# Patient Record
Sex: MALE | Employment: UNEMPLOYED | URBAN - METROPOLITAN AREA
[De-identification: names, ages, dates, MRNs, and addresses within clinical notes are randomized per-mention and may not be internally consistent; named-entity substitution may affect disease eponyms.]

---

## 2020-04-01 ENCOUNTER — HOSPITAL ENCOUNTER (INPATIENT)
Facility: HOSPITAL | Age: 70
LOS: 6 days | Discharge: HOME | DRG: 177 | End: 2020-04-07
Attending: EMERGENCY MEDICINE | Admitting: FAMILY MEDICINE
Payer: COMMERCIAL

## 2020-04-01 ENCOUNTER — APPOINTMENT (EMERGENCY)
Dept: RADIOLOGY | Facility: HOSPITAL | Age: 70
DRG: 177 | End: 2020-04-01
Payer: COMMERCIAL

## 2020-04-01 DIAGNOSIS — R06.02 SHORTNESS OF BREATH: Primary | ICD-10-CM

## 2020-04-01 DIAGNOSIS — U07.1 PNEUMONIA DUE TO COVID-19 VIRUS: ICD-10-CM

## 2020-04-01 DIAGNOSIS — R09.02 HYPOXIA: ICD-10-CM

## 2020-04-01 DIAGNOSIS — J12.82 PNEUMONIA DUE TO COVID-19 VIRUS: ICD-10-CM

## 2020-04-01 PROBLEM — J12.9 VIRAL PNEUMONIA: Status: ACTIVE | Noted: 2020-04-01

## 2020-04-01 PROBLEM — J96.01 ACUTE RESPIRATORY FAILURE WITH HYPOXIA (HCC): Status: ACTIVE | Noted: 2020-04-01

## 2020-04-01 LAB
ALBUMIN SERPL BCP-MCNC: 3.7 G/DL (ref 3–5.2)
ALP SERPL-CCNC: 40 U/L (ref 43–122)
ALT SERPL W P-5'-P-CCNC: 31 U/L (ref 9–52)
ANION GAP SERPL CALCULATED.3IONS-SCNC: 7 MMOL/L (ref 5–14)
AST SERPL W P-5'-P-CCNC: 40 U/L (ref 17–59)
ATRIAL RATE: 87 BPM
BASOPHILS # BLD AUTO: 0.1 THOUSANDS/ΜL (ref 0–0.1)
BASOPHILS NFR BLD AUTO: 1 % (ref 0–1)
BILIRUB SERPL-MCNC: 0.6 MG/DL
BUN SERPL-MCNC: 19 MG/DL (ref 5–25)
CALCIUM SERPL-MCNC: 8.3 MG/DL (ref 8.4–10.2)
CHLORIDE SERPL-SCNC: 104 MMOL/L (ref 97–108)
CO2 SERPL-SCNC: 25 MMOL/L (ref 22–30)
CREAT SERPL-MCNC: 1.21 MG/DL (ref 0.7–1.5)
EOSINOPHIL # BLD AUTO: 0.1 THOUSAND/ΜL (ref 0–0.4)
EOSINOPHIL NFR BLD AUTO: 2 % (ref 0–6)
ERYTHROCYTE [DISTWIDTH] IN BLOOD BY AUTOMATED COUNT: 13.6 %
FLUAV RNA NPH QL NAA+PROBE: NORMAL
FLUBV RNA NPH QL NAA+PROBE: NORMAL
GFR SERPL CREATININE-BSD FRML MDRD: 61 ML/MIN/1.73SQ M
GLUCOSE SERPL-MCNC: 106 MG/DL (ref 70–99)
HCT VFR BLD AUTO: 40.1 % (ref 41–53)
HGB BLD-MCNC: 13.5 G/DL (ref 13.5–17.5)
LACTATE SERPL-SCNC: 1.3 MMOL/L (ref 0.7–2)
LYMPHOCYTES # BLD AUTO: 0.7 THOUSANDS/ΜL (ref 0.5–4)
LYMPHOCYTES NFR BLD AUTO: 12 % (ref 25–45)
MCH RBC QN AUTO: 29.6 PG (ref 26–34)
MCHC RBC AUTO-ENTMCNC: 33.8 G/DL (ref 31–36)
MCV RBC AUTO: 88 FL (ref 80–100)
MONOCYTES # BLD AUTO: 0.2 THOUSAND/ΜL (ref 0.2–0.9)
MONOCYTES NFR BLD AUTO: 4 % (ref 1–10)
NEUTROPHILS # BLD AUTO: 4.6 THOUSANDS/ΜL (ref 1.8–7.8)
NEUTS SEG NFR BLD AUTO: 80 % (ref 45–65)
NT-PROBNP SERPL-MCNC: 68.2 PG/ML (ref 0–299)
P AXIS: 49 DEGREES
PLATELET # BLD AUTO: 201 THOUSANDS/UL (ref 150–450)
PMV BLD AUTO: 9 FL (ref 8.9–12.7)
POTASSIUM SERPL-SCNC: 4 MMOL/L (ref 3.6–5)
PR INTERVAL: 172 MS
PROCALCITONIN SERPL-MCNC: <0.05 NG/ML
PROT SERPL-MCNC: 7.1 G/DL (ref 5.9–8.4)
QRS AXIS: 12 DEGREES
QRSD INTERVAL: 90 MS
QT INTERVAL: 388 MS
QTC INTERVAL: 466 MS
RBC # BLD AUTO: 4.58 MILLION/UL (ref 4.5–5.9)
RSV RNA NPH QL NAA+PROBE: NORMAL
SODIUM SERPL-SCNC: 136 MMOL/L (ref 137–147)
T WAVE AXIS: 26 DEGREES
TROPONIN I SERPL-MCNC: <0.01 NG/ML (ref 0–0.03)
VENTRICULAR RATE: 87 BPM
WBC # BLD AUTO: 5.7 THOUSAND/UL (ref 4.5–11)

## 2020-04-01 PROCEDURE — 87040 BLOOD CULTURE FOR BACTERIA: CPT | Performed by: PHYSICIAN ASSISTANT

## 2020-04-01 PROCEDURE — 83880 ASSAY OF NATRIURETIC PEPTIDE: CPT | Performed by: PHYSICIAN ASSISTANT

## 2020-04-01 PROCEDURE — 82728 ASSAY OF FERRITIN: CPT | Performed by: PHYSICIAN ASSISTANT

## 2020-04-01 PROCEDURE — 71045 X-RAY EXAM CHEST 1 VIEW: CPT

## 2020-04-01 PROCEDURE — 93010 ELECTROCARDIOGRAM REPORT: CPT | Performed by: INTERNAL MEDICINE

## 2020-04-01 PROCEDURE — 83615 LACTATE (LD) (LDH) ENZYME: CPT | Performed by: PHYSICIAN ASSISTANT

## 2020-04-01 PROCEDURE — 99223 1ST HOSP IP/OBS HIGH 75: CPT | Performed by: FAMILY MEDICINE

## 2020-04-01 PROCEDURE — 84484 ASSAY OF TROPONIN QUANT: CPT | Performed by: PHYSICIAN ASSISTANT

## 2020-04-01 PROCEDURE — 83605 ASSAY OF LACTIC ACID: CPT | Performed by: PHYSICIAN ASSISTANT

## 2020-04-01 PROCEDURE — 84145 PROCALCITONIN (PCT): CPT | Performed by: PHYSICIAN ASSISTANT

## 2020-04-01 PROCEDURE — 87635 SARS-COV-2 COVID-19 AMP PRB: CPT | Performed by: PHYSICIAN ASSISTANT

## 2020-04-01 PROCEDURE — 99285 EMERGENCY DEPT VISIT HI MDM: CPT | Performed by: PHYSICIAN ASSISTANT

## 2020-04-01 PROCEDURE — 87631 RESP VIRUS 3-5 TARGETS: CPT | Performed by: PHYSICIAN ASSISTANT

## 2020-04-01 PROCEDURE — 86140 C-REACTIVE PROTEIN: CPT | Performed by: PHYSICIAN ASSISTANT

## 2020-04-01 PROCEDURE — 87449 NOS EACH ORGANISM AG IA: CPT | Performed by: PHYSICIAN ASSISTANT

## 2020-04-01 PROCEDURE — 36415 COLL VENOUS BLD VENIPUNCTURE: CPT | Performed by: PHYSICIAN ASSISTANT

## 2020-04-01 PROCEDURE — 82553 CREATINE MB FRACTION: CPT | Performed by: PHYSICIAN ASSISTANT

## 2020-04-01 PROCEDURE — 80053 COMPREHEN METABOLIC PANEL: CPT | Performed by: PHYSICIAN ASSISTANT

## 2020-04-01 PROCEDURE — 99285 EMERGENCY DEPT VISIT HI MDM: CPT

## 2020-04-01 PROCEDURE — 85025 COMPLETE CBC W/AUTO DIFF WBC: CPT | Performed by: PHYSICIAN ASSISTANT

## 2020-04-01 PROCEDURE — 93005 ELECTROCARDIOGRAM TRACING: CPT

## 2020-04-01 PROCEDURE — 82550 ASSAY OF CK (CPK): CPT | Performed by: PHYSICIAN ASSISTANT

## 2020-04-01 RX ORDER — HYDROXYCHLOROQUINE SULFATE 200 MG/1
200 TABLET, FILM COATED ORAL 2 TIMES DAILY
Status: COMPLETED | OUTPATIENT
Start: 2020-04-02 | End: 2020-04-06

## 2020-04-01 RX ORDER — HYDROXYCHLOROQUINE SULFATE 200 MG/1
400 TABLET, FILM COATED ORAL 2 TIMES DAILY
Status: COMPLETED | OUTPATIENT
Start: 2020-04-01 | End: 2020-04-02

## 2020-04-01 RX ORDER — ACETAMINOPHEN 325 MG/1
650 TABLET ORAL EVERY 6 HOURS PRN
Status: DISCONTINUED | OUTPATIENT
Start: 2020-04-01 | End: 2020-04-07 | Stop reason: HOSPADM

## 2020-04-01 RX ORDER — ONDANSETRON 2 MG/ML
4 INJECTION INTRAMUSCULAR; INTRAVENOUS EVERY 6 HOURS PRN
Status: DISCONTINUED | OUTPATIENT
Start: 2020-04-01 | End: 2020-04-02

## 2020-04-01 RX ADMIN — HYDROXYCHLOROQUINE SULFATE 400 MG: 200 TABLET, FILM COATED ORAL at 18:00

## 2020-04-01 RX ADMIN — ACETAMINOPHEN 650 MG: 325 TABLET ORAL at 21:17

## 2020-04-01 NOTE — ED NOTES
This RN attempted to call back Kathia Nolasco (Nurse from facility where patient resides) [phone] , no response and no voicemail        Jayy Adam RN  04/01/20 7371

## 2020-04-01 NOTE — ASSESSMENT & PLAN NOTE
Patient presented from [facility] with complaint of increasing short of breath for last 2 days, was found to have fever of 101 by the EMS, fever of 99 8 in the ER  Patient was also found to be hypoxic in ER, currently at nasal cannula 4 L, breathing comfortably at 94%  In the ER, lymphocytopenia noted, normal white blood cell count, normal platelets, normal lactic acid  Pending flu RSV urinary Legionella and strep, blood culture  Chest x-ray is consistent with viral pneumonia : Somewhat technically limited examination demonstrating parenchymal pattern in the well-visualized mid and lower lung zones that is nonspecific but can be seen in the setting of viral pneumonia    Pending COVID 19, place in droplet, contact and airborne precaution  Continue oxygen  Start Plaquenil 400 mg twice a day for 1 day, then start 200 mg twice a day for 4 days  EKG reviewed, normal sinus rhythm, normal QT

## 2020-04-01 NOTE — ED NOTES
Patient provided with a lunch tray, reminded patient that he must put his surgical mask back on as soon as he is done eating       Mercedez Pleitez RN  04/01/20 3538

## 2020-04-01 NOTE — ED NOTES
Patient weaned down to 3 liters of oxygen via nasal cannula, per admitting provider  Oxygen saturation remained at 95%        Art Lucas RN  04/01/20 4189

## 2020-04-01 NOTE — ASSESSMENT & PLAN NOTE
Secondary to viral pneumonia, presumptive COVID 19 positive, pending, flu/rsv, urinary legionella/strep  Patient presented with worsening short of breath for past 2 days with fever  A known sick contact however he lives in [address] [place] [address] which is a group home, recently was discharged from [address]  On presentation in the ER, was found to have oxygen saturation 83% in room air, immediately improved to 94% with oxygen via nasal cannula, he was on 4 L oxygen, now weaning off, to 3L, oxygenating >95%    Will place patient on respiratory protocol, continuous pulse ox, telemetry

## 2020-04-01 NOTE — ED NOTES
Patient asked this RN to contact his wife Urmila Wiggins  This RN contacted patients spouse and gave her and update  Spouse's information placed in demographics in chart        Thaddeus George RN  04/01/20 8372

## 2020-04-01 NOTE — H&P
H&P- Yanick Verma 1950, 77 y o  male MRN: [id number]    Unit/Bed#: ED 10 Encounter: [id number]    Primary Care Provider: No primary care provider on file  Date and time admitted to hospital: 4/1/2020 11:02 AM        Viral pneumonia  Assessment & Plan  Patient presented from [facility] with complaint of increasing short of breath for last 2 days, was found to have fever of 101 by the EMS, fever of 99 8 in the ER  Patient was also found to be hypoxic in ER, currently at nasal cannula 4 L, breathing comfortably at 94%  In the ER, lymphocytopenia noted, normal white blood cell count, normal platelets, normal lactic acid  Pending flu RSV urinary Legionella and strep, blood culture  Chest x-ray is consistent with viral pneumonia : Somewhat technically limited examination demonstrating parenchymal pattern in the well-visualized mid and lower lung zones that is nonspecific but can be seen in the setting of viral pneumonia  Pending COVID 19, place in droplet, contact and airborne precaution  Continue oxygen  Start Plaquenil 400 mg twice a day for 1 day, then start 200 mg twice a day for 4 days  EKG reviewed, normal sinus rhythm, normal QT    * Acute respiratory failure with hypoxia ([address] )  Assessment & Plan  Secondary to viral pneumonia, presumptive COVID 19 positive, pending, flu/rsv, urinary legionella/strep  Patient presented with worsening short of breath for past 2 days with fever  A known sick contact however he lives in [facility] which is a group home, recently was discharged from [address]  On presentation in the ER, was found to have oxygen saturation 83% in room air, immediately improved to 94% with oxygen via nasal cannula, he was on 4 L oxygen, now weaning off, to 3L, oxygenating >95%    Will place patient on respiratory protocol, continuous pulse ox, telemetry      VTE Prophylaxis: Enoxaparin (Lovenox)  / sequential compression device   Code Status: full     Anticipated Length of Stay: Patient will be admitted on an Inpatient basis with an anticipated length of stay of  > 2 midnights  Justification for Hospital Stay: acute resp failure, viral pneumonia    Total Time for Visit, including Counseling / Coordination of Care: 1 hour  Greater than 50% of this total time spent on direct patient counseling and coordination of care  Chief Complaint:   Shortness of breath    History of Present Illness:    Brina Lopez is a 77 y o  male who presents with worsening short of breath from past 2 days  Patient states he does not have any past medical history, never been a smoker, currently does not smoke  Patient presents from [id number] [place] [address] which is a group home where he moved in 2 days ago  Prior to that he was incarcerated in the [facility] present  EMS found to have fever of 101, in ER fever was 99 8  In ER, normal white blood cell count with lymphocytopenia, normal lactic acid, chest x-ray is consistent with viral pneumonia bilaterally  Patient was found to have pulse ox of 83% on admission in room air, which improved to 94% with nasal cannula  On my exam, patient states shortness of breath is better with the oxygen  No significant cough  Review of Systems:    Review of Systems   Constitutional: Positive for fever  Negative for activity change and appetite change  HENT: Negative for congestion and sore throat  Eyes: Negative for pain and visual disturbance  Respiratory: Positive for shortness of breath  Negative for cough  Cardiovascular: Negative for chest pain and leg swelling  Gastrointestinal: Negative for abdominal distention and abdominal pain  Endocrine: Negative for polyuria  Genitourinary: Negative for difficulty urinating  Musculoskeletal: Negative for arthralgias and joint swelling  Skin: Negative for wound  Allergic/Immunologic: Negative for food allergies  Neurological: Negative for light-headedness and headaches  Hematological: Negative for adenopathy  Psychiatric/Behavioral: Negative for sleep disturbance  Past Medical and Surgical History:     History reviewed  No pertinent past medical history  Past Surgical History:   Procedure Laterality Date    BRAIN SURGERY         Meds/Allergies:    Prior to Admission medications    Not on File     I have reviewed home medications using allscripts  Allergies: No Known Allergies    Social History:     Marital Status:     Occupation: unemployhed  Patient Pre-hospital Living Situation: [facility]  Patient Pre-hospital Level of Mobility: active  Patient Pre-hospital Diet Restrictions: none  Substance Use History:   Social History     Substance and Sexual Activity   Alcohol Use Not Currently    Comment: "Not anymore"      Social History     Tobacco Use   Smoking Status Never Smoker   Smokeless Tobacco Never Used     Social History     Substance and Sexual Activity   Drug Use Not Currently    Types: Cocaine    Comment: Last used in December 2019       Family History:    History reviewed  No pertinent family history  Physical Exam:     Vitals:   Blood Pressure: 118/71 (04/01/20 1230)  Pulse: 89 (04/01/20 1230)  Temperature: 99 8 °F (37 7 °C) (04/01/20 1110)  Temp Source: Oral (04/01/20 1110)  Respirations: 22 (04/01/20 1230)  Weight - Scale: 87 8 kg (193 lb 9 6 oz) (04/01/20 1110)  SpO2: 92 % (04/01/20 1230)    Physical Exam   Constitutional: He is oriented to person, place, and time  He appears well-developed and well-nourished  HENT:   Head: Normocephalic and atraumatic  Right Ear: External ear normal    Left Ear: External ear normal    Nose: Nose normal    Pulmonary/Chest: Effort normal  No respiratory distress  No visible increased work of breathing on nasal canula   Neurological: He is alert and oriented to person, place, and time  Skin: Skin is dry  Additional Data:     Lab Results: I have personally reviewed pertinent reports        Results from last 7 days   Lab Units 04/01/20  1135   WBC Thousand/uL 5 70   HEMOGLOBIN g/dL 13 5   HEMATOCRIT % 40 1*   PLATELETS Thousands/uL 201   NEUTROS PCT % 80*   LYMPHS PCT % 12*   MONOS PCT % 4   EOS PCT % 2     Results from last 7 days   Lab Units 04/01/20  1135   SODIUM mmol/L 136*   POTASSIUM mmol/L 4 0   CHLORIDE mmol/L 104   CO2 mmol/L 25   BUN mg/dL 19   CREATININE mg/dL 1 21   ANION GAP mmol/L 7   CALCIUM mg/dL 8 3*   ALBUMIN g/dL 3 7   TOTAL BILIRUBIN mg/dL 0 60   ALK PHOS U/L 40*   ALT U/L 31   AST U/L 40   GLUCOSE RANDOM mg/dL 106*                 Results from last 7 days   Lab Units 04/01/20  1135   LACTIC ACID mmol/L 1 3       Imaging: I have personally reviewed pertinent reports  XR chest portable   Final Result by Holden De Paz MD (04/01 1243)      Somewhat technically limited examination demonstrating parenchymal pattern in the well-visualized mid and lower lung zones that is nonspecific but can be seen in the setting of viral pneumonia  This examination was marked "immediate notification" in Epic in order to begin the standard process by which the radiology reading room liaison alerts the referring practitioner  Workstation performed: [id number]             EKG, Pathology, and Other Studies Reviewed on Admission:   · EKG: normal sinus rhythm    Allscripts / Epic Records Reviewed: Yes     ** Please Note: This note has been constructed using a voice recognition system   **

## 2020-04-02 PROBLEM — U07.1 COVID-19 VIRUS INFECTION: Status: ACTIVE | Noted: 2020-04-02

## 2020-04-02 PROBLEM — J12.82 PNEUMONIA DUE TO COVID-19 VIRUS: Status: ACTIVE | Noted: 2020-04-01

## 2020-04-02 PROBLEM — U07.1 PNEUMONIA DUE TO COVID-19 VIRUS: Status: ACTIVE | Noted: 2020-04-01

## 2020-04-02 LAB
CK MB SERPL-MCNC: <1 % (ref 0–2.5)
CK MB SERPL-MCNC: <1 NG/ML (ref 0–5)
CK SERPL-CCNC: 193 U/L (ref 39–308)
D DIMER PPP FEU-MCNC: 7.74 UG/ML FEU
ERYTHROCYTE [SEDIMENTATION RATE] IN BLOOD: 80 MM/HOUR (ref 1–20)
FERRITIN SERPL-MCNC: 630 NG/ML (ref 8–388)
FIBRINOGEN PPP-MCNC: 677 MG/DL (ref 227–495)
L PNEUMO1 AG UR QL IA.RAPID: NEGATIVE
LDH SERPL-CCNC: 476 U/L (ref 81–234)
S PNEUM AG UR QL: NEGATIVE
SARS-COV-2 RNA SPEC QL NAA+PROBE: DETECTED

## 2020-04-02 PROCEDURE — 85384 FIBRINOGEN ACTIVITY: CPT | Performed by: PHYSICIAN ASSISTANT

## 2020-04-02 PROCEDURE — 85652 RBC SED RATE AUTOMATED: CPT | Performed by: PHYSICIAN ASSISTANT

## 2020-04-02 PROCEDURE — 82955 ASSAY OF G6PD ENZYME: CPT | Performed by: PHYSICIAN ASSISTANT

## 2020-04-02 PROCEDURE — 85379 FIBRIN DEGRADATION QUANT: CPT | Performed by: PHYSICIAN ASSISTANT

## 2020-04-02 PROCEDURE — 99232 SBSQ HOSP IP/OBS MODERATE 35: CPT | Performed by: PHYSICIAN ASSISTANT

## 2020-04-02 RX ORDER — CEFTRIAXONE 1 G/50ML
1000 INJECTION, SOLUTION INTRAVENOUS EVERY 24 HOURS
Status: DISCONTINUED | OUTPATIENT
Start: 2020-04-02 | End: 2020-04-07 | Stop reason: HOSPADM

## 2020-04-02 RX ORDER — ASCORBIC ACID 500 MG
1000 TABLET ORAL 2 TIMES DAILY
Status: DISCONTINUED | OUTPATIENT
Start: 2020-04-02 | End: 2020-04-07 | Stop reason: HOSPADM

## 2020-04-02 RX ORDER — ATORVASTATIN CALCIUM 40 MG/1
40 TABLET, FILM COATED ORAL
Status: DISCONTINUED | OUTPATIENT
Start: 2020-04-02 | End: 2020-04-07 | Stop reason: HOSPADM

## 2020-04-02 RX ORDER — METHYLPREDNISOLONE SODIUM SUCCINATE 125 MG/2ML
80 INJECTION, POWDER, LYOPHILIZED, FOR SOLUTION INTRAMUSCULAR; INTRAVENOUS DAILY
Status: DISCONTINUED | OUTPATIENT
Start: 2020-04-02 | End: 2020-04-04

## 2020-04-02 RX ORDER — ZINC SULFATE 50(220)MG
220 CAPSULE ORAL DAILY
Status: DISCONTINUED | OUTPATIENT
Start: 2020-04-02 | End: 2020-04-07 | Stop reason: HOSPADM

## 2020-04-02 RX ORDER — AZITHROMYCIN 250 MG/1
500 TABLET, FILM COATED ORAL EVERY 24 HOURS
Status: COMPLETED | OUTPATIENT
Start: 2020-04-02 | End: 2020-04-02

## 2020-04-02 RX ORDER — AZITHROMYCIN 250 MG/1
250 TABLET, FILM COATED ORAL EVERY 24 HOURS
Status: DISCONTINUED | OUTPATIENT
Start: 2020-04-03 | End: 2020-04-05

## 2020-04-02 RX ADMIN — AZITHROMYCIN 500 MG: 250 TABLET, FILM COATED ORAL at 18:45

## 2020-04-02 RX ADMIN — ENOXAPARIN SODIUM 40 MG: 40 INJECTION SUBCUTANEOUS at 09:11

## 2020-04-02 RX ADMIN — HYDROXYCHLOROQUINE SULFATE 200 MG: 200 TABLET, FILM COATED ORAL at 18:45

## 2020-04-02 RX ADMIN — ATORVASTATIN CALCIUM 40 MG: 40 TABLET, FILM COATED ORAL at 20:17

## 2020-04-02 RX ADMIN — HYDROXYCHLOROQUINE SULFATE 400 MG: 200 TABLET, FILM COATED ORAL at 09:11

## 2020-04-02 RX ADMIN — CEFTRIAXONE 1000 MG: 1 INJECTION, SOLUTION INTRAVENOUS at 20:17

## 2020-04-02 RX ADMIN — ZINC SULFATE 220 MG (50 MG) CAPSULE 220 MG: CAPSULE at 20:17

## 2020-04-02 RX ADMIN — METHYLPREDNISOLONE SODIUM SUCCINATE 80 MG: 125 INJECTION, POWDER, FOR SOLUTION INTRAMUSCULAR; INTRAVENOUS at 20:16

## 2020-04-02 RX ADMIN — OXYCODONE HYDROCHLORIDE AND ACETAMINOPHEN 1000 MG: 500 TABLET ORAL at 20:16

## 2020-04-02 NOTE — UTILIZATION REVIEW
Initial Clinical Review    Admission: Date/Time/Statement: Admission Orders (From admission, onward)     Ordered        04/01/20 1232  Inpatient Admission  Once                   Orders Placed This Encounter   Procedures    Inpatient Admission     Tele, continuous pulse ox     Standing Status:   Standing     Number of Occurrences:   1     Order Specific Question:   Admitting Physician     Answer:   Jarvis Dorantes     Order Specific Question:   Level of Care     Answer:   Med Surg [16]     Order Specific Question:   Estimated length of stay     Answer:   More than 2 Midnights     Order Specific Question:   Certification     Answer:   I certify that inpatient services are medically necessary for this patient for a duration of greater than two midnights  See H&P and MD Progress Notes for additional information about the patient's course of treatment  ED Arrival Information     Expected Arrival Acuity Means of Arrival Escorted By Service Admission Type    - 4/1/2020 11:01 [facility] ([address]) General Medicine Emergency    Arrival Complaint    Cold Like Symptoms        Chief Complaint   Patient presents with   [address] in via EMS from [address]  Patient was in senior living in [facility] and just got to [address] [facility] on Monday  Patient c/o SOB worse with exertion  Assessment/Plan: 78 yo male w/o prior medical hx presents to ED via EMS for SOB x 2 days  He was recently incarcerated and discharged to [address] [facility]  group home  Denies cough, CP or fever but EMS noted temp 101  Unknown sick contacts  Hypoxic-Sating at 83% on RA, pt placed on 4L nasal cannula up to 95%  CXR:  with viral pneumonia bilaterally, EKG NSR  Admitted to IP with Acute Resp Failure 2/2 Viral Pneumonia - presumptive COVID +  Plan: Tele, Continuous pulse ox,  Droplet, contact and airborne precaution, Supplemental O2, Plaquenil,    4/2: COVID -19 +    + cough, body aches   Remains on O2 @ 4 L NC  Continue Plaquenil       ED Triage Vitals   Temperature Pulse Respirations Blood Pressure SpO2   04/01/20 1110 04/01/20 1110 04/01/20 1110 04/01/20 1110 04/01/20 1110   99 8 °F (37 7 °C) 96 (!) 23 111/62 94 %      Temp Source Heart Rate Source Patient Position - Orthostatic VS BP Location FiO2 (%)   04/01/20 1110 04/01/20 1110 04/01/20 1110 04/01/20 1110 --   Oral Monitor Sitting Left arm       Pain Score       04/01/20 1703       No Pain        Wt Readings from Last 1 Encounters:   04/01/20 83 kg (182 lb 15 7 oz)     Additional Vital Signs:   /Time  Temp Pulse Resp BP SpO2 O2 Device Patient Position    04/02/20 0754  98 4 °F (36 9 °C) 83 20 140/88 92 % Nasal cannula 4L Lying   04/02/20 0046  99 3 °F (37 4 °C)         04/01/20 2046  101 °F (38 3 °C) 90 20  90 % Nasal cannula    04/01/20 1703  98 2 °F (36 8 °C) 80 20 135/84 91 % Nasal cannula  Lying   04/01/20 1400   78 18 120/70 96 % Nasal cannula 4L    04/01/20 1230   89 22 118/71 92 % Nasal cannula Sitting   04/01/20 1200       Nasal cannula    04/01/20 1110  99 8 °F (37 7 °C) 96 23Abnormal  111/62 94 %  Nasal cannula 4L Sitting   SpO2: Patient was at 83% on RA, placed on 4L NC stats now at 94% at 04/01/20 1110       Pertinent Labs/Diagnostic Test Results:   Results from last 7 days   Lab Units 04/01/20  1135   WBC Thousand/uL 5 70   HEMOGLOBIN g/dL 13 5   HEMATOCRIT % 40 1*   PLATELETS Thousands/uL 201   NEUTROS ABS Thousands/µL 4 60     Results from last 7 days   Lab Units 04/01/20  1135   SODIUM mmol/L 136*   POTASSIUM mmol/L 4 0   CHLORIDE mmol/L 104   CO2 mmol/L 25   ANION GAP mmol/L 7   BUN mg/dL 19   CREATININE mg/dL 1 21   EGFR ml/min/1 73sq m 61   CALCIUM mg/dL 8 3*     Results from last 7 days   Lab Units 04/01/20  1135   AST U/L 40   ALT U/L 31   ALK PHOS U/L 40*   TOTAL PROTEIN g/dL 7 1   ALBUMIN g/dL 3 7   TOTAL BILIRUBIN mg/dL 0 60     Results from last 7 days   Lab Units 04/01/20  1135   GLUCOSE RANDOM mg/dL 106* Results from last 7 days   Lab Units 04/01/20  1253   CK TOTAL U/L 193     Results from last 7 days   Lab Units 04/01/20  1135   TROPONIN I ng/mL <0 01     Results from last 7 days   Lab Units 04/01/20  1253   PROCALCITONIN ng/ml <0 05     Results from last 7 days   Lab Units 04/01/20  1135   LACTIC ACID mmol/L 1 3     Results from last 7 days   Lab Units 04/01/20  1135   NT-PRO BNP pg/mL 68 2     Results from last 7 days   Lab Units 04/01/20  1253   FERRITIN ng/mL 630*     Results from last 7 days   Lab Units 04/01/20  1253   CRP mg/L >90 0*     Results from last 7 days   Lab Units 04/01/20  1622 04/01/20  1253   STREP PNEUMONIAE ANTIGEN, URINE  Negative  --    LEGIONELLA URINARY ANTIGEN  Negative  --    INFLUENZA A PCR   --  None Detected   INFLUENZA B PCR   --  None Detected   RSV PCR   --  None Detected     Results from last 7 days   Lab Units 04/01/20  1518   SARS-COV-2  Detected*     Results from last 7 days   Lab Units 04/01/20  1158 04/01/20  1135   BLOOD CULTURE  Received in Microbiology Lab  Culture in Progress  Received in Microbiology Lab  Culture in Progress  4/1 CXR: Somewhat technically limited examination demonstrating parenchymal pattern in the well-visualized mid and lower lung zones that is nonspecific but can be seen in the setting of viral pneumonia  4/1 EKG: Normal sinus rhythm    ED Treatment:   Medication Administration from 04/01/2020 1101 to 04/01/2020 1658     None        History reviewed  No pertinent past medical history  Admitting Diagnosis: Shortness of breath [R06 02]  Hypoxia [R09 02]  Age/Sex: 77 y o  male  Admission Orders:  Scheduled Medications:  Medications:  enoxaparin 40 mg Subcutaneous Daily   hydroxychloroquine 200 mg Oral BID     Continuous IV Infusions:  NA  PRN Meds:  acetaminophen 650 mg Oral Q6H PRN x 1 4/1     TELE  Continuous Pulse Ox  Isolation  SCDs    Network Utilization Review Department  [email or web address]  ATTENTION: Please call with any questions or concerns to [phone] and carefully listen to the prompts so that you are directed to the right person  All voicemails are confidential   Allison Caballero all requests for admission clinical reviews, approved or denied determinations and any other requests to dedicated fax number below belonging to the campus where the patient is receiving treatment   List of dedicated fax numbers for the Facilities:  [address] DENIALS (Administrative/Medical Necessity) [phone]   [address] (Maternity/NICU/Pediatrics) [phone]   Inderjit Needs [phone]   [facility] [phone]   [facility] [phone]   Genia Settle [phone]   [address] [place] [address] [phone]   [facility]  [phone]   [address] [facility] [address] [facility]  [address] [facility] [address] [facility] [phone]

## 2020-04-02 NOTE — PROGRESS NOTES
[facility] Internal Medicine  Progress Note - Miya Linda 1950, 77 y o  male MRN: [id number]  Unit/Bed#:  -01 Encounter: [id number]  Primary Care Provider: No primary care provider on file  Date and time admitted to hospital: 4/1/2020 11:02 AM    * Acute respiratory failure with hypoxia (HCC)  Assessment & Plan  · Secondary to COVID-19  · Still on oxygen (4L) which has not increased in need since admission  · Monitor O2 sat closely- if O2 sat drops or O2 increase is needed will discuss with ICU  · Continue plaquenil 200 mg BID x 8 total doses (already received 400 mg PO BID x 2 doses)  · Stop zofran  Monitor QTC on telemetry      Pneumonia due to COVID-19 virus  Assessment & Plan  · Patient presented from [facility] with complaint of increasing short of breath for last 2 days  · Positive fever, hypoxia and SOB/cough  · No lymphopenia  · Negative for influenza  · Continue oxygen  · Continue plaquenil  ·   · CXR: Somewhat technically limited examination demonstrating parenchymal pattern in the well-visualized mid and lower lung zones that is nonspecific but can be seen in the setting of viral pneumonia  · Check G6PD, LDH, ferritin, d-dimer, fibrinogen    COVID-19 positive  Assessment & Plan  · Continue plaquenil  · Await labs  · Airborne/droplet isolation  · Supportive care    VTE Pharmacologic Prophylaxis:   Pharmacologic: Enoxaparin (Lovenox)  Mechanical VTE Prophylaxis in Place: Yes    Patient Centered Rounds: I have performed bedside rounds with nursing staff today  Discussions with Specialists or Other Care Team Provider: nursing    Education and Discussions with Family / Patient: patient    Time Spent for Care: 30 minutes  More than 50% of total time spent on counseling and coordination of care as described above      Current Length of Stay: 1 day(s)    Current Patient Status: Inpatient   Certification Statement: The patient will continue to require additional inpatient hospital stay due to hypoxia    Discharge Plan: continue to monitor O2 sats    Code Status: Level 1 - Full Code      Subjective:   Feels okay  Wants wife to be updated  Still with cough  Has body aches    Objective:     Vitals:   Temp (24hrs), Av 3 °F (37 4 °C), Min:98 2 °F (36 8 °C), Max:101 °F (38 3 °C)    Temp:  [98 2 °F (36 8 °C)-101 °F (38 3 °C)] 98 4 °F (36 9 °C)  HR:  [78-96] 83  Resp:  [18-23] 20  BP: (111-140)/(62-88) 140/88  SpO2:  [90 %-96 %] 92 %  Body mass index is 24 82 kg/m²  Input and Output Summary (last 24 hours): Intake/Output Summary (Last 24 hours) at 2020 1039  Last data filed at 2020 0330  Gross per 24 hour   Intake    Output 1130 ml   Net -1130 ml       Physical Exam:     Physical Exam   Constitutional: No distress  HENT:   Head: Normocephalic and atraumatic  Eyes: No scleral icterus  Pulmonary/Chest: Effort normal  No accessory muscle usage  No respiratory distress  4L NC   nonlabored  No distress  Speaking in full sentences   Abdominal: Soft  Bowel sounds are normal  He exhibits no distension  There is no tenderness  There is no rigidity, no rebound and no guarding  Musculoskeletal: He exhibits no edema  Neurological: He is alert  Skin: Skin is warm and dry  No rash noted  He is not diaphoretic  No erythema  Psychiatric: He has a normal mood and affect  His behavior is normal    Nursing note and vitals reviewed  PPE: patient without PPE   Me: N95, gown/gloves/facesheild    Additional Data:     Labs:    Results from last 7 days   Lab Units 20  1135   WBC Thousand/uL 5 70   HEMOGLOBIN g/dL 13 5   HEMATOCRIT % 40 1*   PLATELETS Thousands/uL 201   NEUTROS PCT % 80*   LYMPHS PCT % 12*   MONOS PCT % 4   EOS PCT % 2     Results from last 7 days   Lab Units 20  1135   SODIUM mmol/L 136*   POTASSIUM mmol/L 4 0   CHLORIDE mmol/L 104   CO2 mmol/L 25   BUN mg/dL 19   CREATININE mg/dL 1 21   ANION GAP mmol/L 7   CALCIUM mg/dL 8 3*   ALBUMIN g/dL 3 7   TOTAL BILIRUBIN mg/dL 0 60   ALK PHOS U/L 40*   ALT U/L 31   AST U/L 40   GLUCOSE RANDOM mg/dL 106*                 Results from last 7 days   Lab Units 04/01/20  1253 04/01/20  1135   LACTIC ACID mmol/L  --  1 3   PROCALCITONIN ng/ml <0 05  --            * I Have Reviewed All Lab Data Listed Above  * Additional Pertinent Lab Tests Reviewed: All Labs Within Last 24 Hours Reviewed    Imaging:    Imaging Reports Reviewed Today Include: CXR  Imaging Personally Reviewed by Myself Includes:  none    Recent Cultures (last 7 days):     Results from last 7 days   Lab Units 04/01/20  1622 04/01/20  1158 04/01/20  1135   BLOOD CULTURE   --  Received in Microbiology Lab  Culture in Progress  Received in Microbiology Lab  Culture in Progress  LEGIONELLA URINARY ANTIGEN  Negative  --   --        Last 24 Hours Medication List:     Current Facility-Administered Medications:  acetaminophen 650 mg Oral Q6H PRN SARA Ulrich   enoxaparin 40 mg Subcutaneous Daily Bessie Watkins MD   hydroxychloroquine 200 mg Oral BID Bessie Watkins MD        Today, Patient Was Seen By: Ofelia Condon PA-C    ** Please Note: Dictation voice to text software may have been used in the creation of this document   **

## 2020-04-02 NOTE — SOCIAL WORK
LOS: 1 GMLOS: 5 5    Pt presented to [facility] ED from [facility] with complaint of worsening SOB and fever  Testing for COVID 19 was positive  Pt admitted for further treatment  Currently pt requiring 4LNC  [facility] contacted unit requesting update on POC  JAYCE called JAYCE who gave permission to inform [facility] of medical status  JAYCE spoke first with nurse Carlos Renae and then Taima Nicole advised that they will not accept pt back to their facility due to the COVID 19 as he cannot self quarantine  Isabela Nicole stated that pt needs to contact his PO from [address] [place] [address] to advise of hospital admission and to develop an alternate d/c plan  SW called pt to discuss  Pt states that he was not planning on returning to [facility] and will be going back to his home to live with his wife Cite  [facility]  Address is [address] [facility] [place] [address]  Pt does not have the info for his PO and requested SW to call his wife to obtain info  Pt requested SW call his PO to advise of admission and to give the PO pt's room phone number  Pt states that his wife and son will drive from [place] to transport him home  VM left with PO Attila at [phone] requesting call back

## 2020-04-02 NOTE — RESPIRATORY THERAPY NOTE
RT Protocol Note  Patricia Farnsworth 77 y o  male MRN: [id number]  Unit/Bed#: 5T -01 Encounter: [id number]    Assessment    Principal Problem:    Acute respiratory failure with hypoxia ([address] )  Active Problems:    Pneumonia due to COVID-19 virus    COVID-19 positive      Home Pulmonary Medications:  No pulmonary medications at this time       History reviewed  No pertinent past medical history  Social History     Socioeconomic History    Marital status: /Civil Union     Spouse name: None    Number of children: None    Years of education: None    Highest education level: None   Occupational History    None   Social Needs    Financial resource strain: None    Food insecurity:     Worry: None     Inability: None    Transportation needs:     Medical: None     Non-medical: None   Tobacco Use    Smoking status: Never Smoker    Smokeless tobacco: Never Used   Substance and Sexual Activity    Alcohol use: Not Currently     Comment: "Not anymore"     Drug use: Not Currently     Types: Cocaine     Comment: Last used in December 2019    Sexual activity: None   Lifestyle    Physical activity:     Days per week: None     Minutes per session: None    Stress: None   Relationships    Social connections:     Talks on phone: None     Gets together: None     Attends Mosque service: None     Active member of club or organization: None     Attends meetings of clubs or organizations: None     Relationship status: None    Intimate partner violence:     Fear of current or ex partner: None     Emotionally abused: None     Physically abused: None     Forced sexual activity: None   Other Topics Concern    None   Social History Narrative    None       Subjective         Objective    Physical Exam:        Vitals:  Blood pressure 140/88, pulse 83, temperature 98 4 °F (36 9 °C), temperature source Tympanic, resp  rate 20, height 6' (1 829 m), weight 83 kg (182 lb 15 7 oz), SpO2 92 %            Imaging and other studies: I have personally reviewed pertinent reports  Plan   no resp treatments are to be given at this time    fpt on 4l o2 and will titrate upon discharge  Respiratory Plan: Discontinue Protocol

## 2020-04-02 NOTE — ASSESSMENT & PLAN NOTE
· Secondary to COVID-19  · Still on oxygen (4L) which has not increased in need since admission  · Monitor O2 sat closely- if O2 sat drops or O2 increase is needed will discuss with ICU  · Continue plaquenil 200 mg BID x 8 total doses (already received 400 mg PO BID x 2 doses)  · Stop zofran   Monitor QTC on telemetry

## 2020-04-02 NOTE — NURSING NOTE
Patient is feeling much better today  Apt is back and has no c/o pain  Still feels sob with activity  Lungs are clear but very diminished  Add on blood work that was not able to be added to am blood work was found in the work list and just drawn and sent to the lab   Will continue to monitor

## 2020-04-02 NOTE — ASSESSMENT & PLAN NOTE
· Patient presented from [facility]/[address] with complaint of increasing short of breath for last 2 days  · Positive fever, hypoxia and SOB/cough  · No lymphopenia  · Negative for influenza  · Continue oxygen  · Continue plaquenil  ·   · CXR: Somewhat technically limited examination demonstrating parenchymal pattern in the well-visualized mid and lower lung zones that is nonspecific but can be seen in the setting of viral pneumonia    · Check G6PD, LDH, ferritin, d-dimer, fibrinogen

## 2020-04-02 NOTE — SOCIAL WORK
Call received from pt's [facility]  Za Ray was not aware of pt's hospital admission  He will be in contact with [facility]/[address] to discuss d/c plan as pt was court ordered to attend program  Za Ray requests SW to keep him updated with POC and to contact his cell #[phone]  Za Ray provided with nurses station number  Call transferred to unit so he can talk with pt directly

## 2020-04-02 NOTE — PLAN OF CARE
Problem: Potential for Falls  Goal: Patient will remain free of falls  Description  INTERVENTIONS:  - Assess patient frequently for physical needs  -  Identify cognitive and physical deficits and behaviors that affect risk of falls    -  [place] fall precautions as indicated by assessment   - Educate patient/family on patient safety including physical limitations  - Instruct patient to call for assistance with activity based on assessment  - Modify environment to reduce risk of injury  - Consider OT/PT consult to assist with strengthening/mobility  Outcome: Progressing     Problem: PAIN - ADULT  Goal: Verbalizes/displays adequate comfort level or baseline comfort level  Description  Interventions:  - Encourage patient to monitor pain and request assistance  - Assess pain using appropriate pain scale  - Administer analgesics based on type and severity of pain and evaluate response  - Implement non-pharmacological measures as appropriate and evaluate response  - Consider cultural and social influences on pain and pain management  - Notify physician/advanced practitioner if interventions unsuccessful or patient reports new pain  Outcome: Progressing     Problem: INFECTION - ADULT  Goal: Absence or prevention of progression during hospitalization  Description  INTERVENTIONS:  - Assess and monitor for signs and symptoms of infection  - Monitor lab/diagnostic results  - Monitor all insertion sites, i e  indwelling lines, tubes, and drains  - Monitor endotracheal if appropriate and nasal secretions for changes in amount and color  - [place] appropriate cooling/warming therapies per order  - Administer medications as ordered  - Instruct and encourage patient and family to use good hand hygiene technique  - Identify and instruct in appropriate isolation precautions for identified infection/condition  Outcome: Progressing  Goal: Absence of fever/infection during neutropenic period  Description  INTERVENTIONS:  - Monitor WBC    Outcome: Progressing     Problem: SAFETY ADULT  Goal: Patient will remain free of falls  Description  INTERVENTIONS:  - Assess patient frequently for physical needs  -  Identify cognitive and physical deficits and behaviors that affect risk of falls    -  [place] fall precautions as indicated by assessment   - Educate patient/family on patient safety including physical limitations  - Instruct patient to call for assistance with activity based on assessment  - Modify environment to reduce risk of injury  - Consider OT/PT consult to assist with strengthening/mobility  Outcome: Progressing  Goal: Maintain or return to baseline ADL function  Description  INTERVENTIONS:  -  Assess patient's ability to carry out ADLs; assess patient's baseline for ADL function and identify physical deficits which impact ability to perform ADLs (bathing, care of mouth/teeth, toileting, grooming, dressing, etc )  - Assess/evaluate cause of self-care deficits   - Assess range of motion  - Assess patient's mobility; develop plan if impaired  - Assess patient's need for assistive devices and provide as appropriate  - Encourage maximum independence but intervene and supervise when necessary  - Involve family in performance of ADLs  - Assess for home care needs following discharge   - Consider OT consult to assist with ADL evaluation and planning for discharge  - Provide patient education as appropriate  Outcome: Progressing  Goal: Maintain or return mobility status to optimal level  Description  INTERVENTIONS:  - Assess patient's baseline mobility status (ambulation, transfers, stairs, etc )    - Identify cognitive and physical deficits and behaviors that affect mobility  - Identify mobility aids required to assist with transfers and/or ambulation (gait belt, sit-to-stand, lift, walker, cane, etc )  - [place] fall precautions as indicated by assessment  - Record patient progress and toleration of activity level on Mobility SBAR; progress patient to next Phase/Stage  - Instruct patient to call for assistance with activity based on assessment  - Consider rehabilitation consult to assist with strengthening/weightbearing, etc   Outcome: Progressing     Problem: DISCHARGE PLANNING  Goal: Discharge to home or other facility with appropriate resources  Description  INTERVENTIONS:  - Identify barriers to discharge w/patient and caregiver  - Arrange for needed discharge resources and transportation as appropriate  - Identify discharge learning needs (meds, wound care, etc )  - Arrange for interpretive services to assist at discharge as needed  - Refer to Case Management Department for coordinating discharge planning if the patient needs post-hospital services based on physician/advanced practitioner order or complex needs related to functional status, cognitive ability, or social support system  Outcome: Progressing     Problem: Knowledge Deficit  Goal: Patient/family/caregiver demonstrates understanding of disease process, treatment plan, medications, and discharge instructions  Description  Complete learning assessment and assess knowledge base    Interventions:  - Provide teaching at level of understanding  - Provide teaching via preferred learning methods  Outcome: Progressing

## 2020-04-02 NOTE — UTILIZATION REVIEW
Notification of Inpatient Admission/Inpatient Authorization Request   This is a Notification of Inpatient Admission for [address]  Be advised that this patient was admitted to our facility under Inpatient Status  Contact Oswald Matute at [phone] for additional admission information  [address] [facility] DEPT DEDICATED [phone]  Patient Name:   Jenae Search   YOB: 1950       [address]   [address]:   [address]  Tax ID: [id number]  NPI: [id number] Attending Provider/NPI:  Phone:  Address: Evelia Villaseñor [[id number]]  [phone]  Same as TRIP/Griselda Paez 1106 of Service Code: 24 [facility]   Start Date: 4/1/20 1232 Discharge Date & Time: No discharge date for patient encounter  Type of Admission: Inpatient Status Discharge Disposition (if discharged): Final discharge disposition not confirmed   Patient Diagnoses: Shortness of breath [R06 02]  Hypoxia [R09 02]     Orders: Admission Orders (From admission, onward)     Ordered        04/01/20 1232  Inpatient Admission  Once                    Assigned Utilization Review Contact: Oswald Matute  Utilization   Network Utilization Review Department  Phone: [phone]; Fax [phone]  Email: [email or web address]   ATTENTION PAYERS: Please call the assigned Utilization  directly with any questions or concerns ALL voicemails in the department are confidential  Send all requests for admission clinical reviews, approved or denied determinations and any other requests to dedicated fax number belonging to the campus where the patient is receiving treatment

## 2020-04-03 LAB
CRP SERPL QL: >90 MG/L
G6PD BLD QN: 249 U/10E12 RBC (ref 146–376)
RBC # BLD AUTO: 4.7 X10E6/UL (ref 4.14–5.8)

## 2020-04-03 PROCEDURE — 99232 SBSQ HOSP IP/OBS MODERATE 35: CPT | Performed by: FAMILY MEDICINE

## 2020-04-03 RX ADMIN — AZITHROMYCIN 250 MG: 250 TABLET, FILM COATED ORAL at 05:30

## 2020-04-03 RX ADMIN — HYDROXYCHLOROQUINE SULFATE 200 MG: 200 TABLET, FILM COATED ORAL at 17:16

## 2020-04-03 RX ADMIN — OXYCODONE HYDROCHLORIDE AND ACETAMINOPHEN 1000 MG: 500 TABLET ORAL at 08:45

## 2020-04-03 RX ADMIN — HYDROXYCHLOROQUINE SULFATE 200 MG: 200 TABLET, FILM COATED ORAL at 08:45

## 2020-04-03 RX ADMIN — OXYCODONE HYDROCHLORIDE AND ACETAMINOPHEN 1000 MG: 500 TABLET ORAL at 17:16

## 2020-04-03 RX ADMIN — ZINC SULFATE 220 MG (50 MG) CAPSULE 220 MG: CAPSULE at 08:45

## 2020-04-03 RX ADMIN — METHYLPREDNISOLONE SODIUM SUCCINATE 80 MG: 125 INJECTION, POWDER, FOR SOLUTION INTRAMUSCULAR; INTRAVENOUS at 08:45

## 2020-04-03 RX ADMIN — ATORVASTATIN CALCIUM 40 MG: 40 TABLET, FILM COATED ORAL at 17:16

## 2020-04-03 RX ADMIN — ENOXAPARIN SODIUM 40 MG: 40 INJECTION SUBCUTANEOUS at 08:45

## 2020-04-03 RX ADMIN — CEFTRIAXONE 1000 MG: 1 INJECTION, SOLUTION INTRAVENOUS at 18:30

## 2020-04-03 NOTE — PLAN OF CARE
Problem: Potential for Falls  Goal: Patient will remain free of falls  Description  INTERVENTIONS:  - Assess patient frequently for physical needs  -  Identify cognitive and physical deficits and behaviors that affect risk of falls    -  [place] fall precautions as indicated by assessment   - Educate patient/family on patient safety including physical limitations  - Instruct patient to call for assistance with activity based on assessment  - Modify environment to reduce risk of injury  - Consider OT/PT consult to assist with strengthening/mobility  Outcome: Progressing     Problem: PAIN - ADULT  Goal: Verbalizes/displays adequate comfort level or baseline comfort level  Description  Interventions:  - Encourage patient to monitor pain and request assistance  - Assess pain using appropriate pain scale  - Administer analgesics based on type and severity of pain and evaluate response  - Implement non-pharmacological measures as appropriate and evaluate response  - Consider cultural and social influences on pain and pain management  - Notify physician/advanced practitioner if interventions unsuccessful or patient reports new pain  Outcome: Progressing     Problem: INFECTION - ADULT  Goal: Absence or prevention of progression during hospitalization  Description  INTERVENTIONS:  - Assess and monitor for signs and symptoms of infection  - Monitor lab/diagnostic results  - Monitor all insertion sites, i e  indwelling lines, tubes, and drains  - Monitor endotracheal if appropriate and nasal secretions for changes in amount and color  - [place] appropriate cooling/warming therapies per order  - Administer medications as ordered  - Instruct and encourage patient and family to use good hand hygiene technique  - Identify and instruct in appropriate isolation precautions for identified infection/condition  Outcome: Progressing  Goal: Absence of fever/infection during neutropenic period  Description  INTERVENTIONS:  - Monitor WBC    Outcome: Progressing     Problem: SAFETY ADULT  Goal: Patient will remain free of falls  Description  INTERVENTIONS:  - Assess patient frequently for physical needs  -  Identify cognitive and physical deficits and behaviors that affect risk of falls    -  [place] fall precautions as indicated by assessment   - Educate patient/family on patient safety including physical limitations  - Instruct patient to call for assistance with activity based on assessment  - Modify environment to reduce risk of injury  - Consider OT/PT consult to assist with strengthening/mobility  Outcome: Progressing  Goal: Maintain or return to baseline ADL function  Description  INTERVENTIONS:  -  Assess patient's ability to carry out ADLs; assess patient's baseline for ADL function and identify physical deficits which impact ability to perform ADLs (bathing, care of mouth/teeth, toileting, grooming, dressing, etc )  - Assess/evaluate cause of self-care deficits   - Assess range of motion  - Assess patient's mobility; develop plan if impaired  - Assess patient's need for assistive devices and provide as appropriate  - Encourage maximum independence but intervene and supervise when necessary  - Involve family in performance of ADLs  - Assess for home care needs following discharge   - Consider OT consult to assist with ADL evaluation and planning for discharge  - Provide patient education as appropriate  Outcome: Progressing  Goal: Maintain or return mobility status to optimal level  Description  INTERVENTIONS:  - Assess patient's baseline mobility status (ambulation, transfers, stairs, etc )    - Identify cognitive and physical deficits and behaviors that affect mobility  - Identify mobility aids required to assist with transfers and/or ambulation (gait belt, sit-to-stand, lift, walker, cane, etc )  - [place] fall precautions as indicated by assessment  - Record patient progress and toleration of activity level on Mobility SBAR; progress patient to next Phase/Stage  - Instruct patient to call for assistance with activity based on assessment  - Consider rehabilitation consult to assist with strengthening/weightbearing, etc   Outcome: Progressing     Problem: DISCHARGE PLANNING  Goal: Discharge to home or other facility with appropriate resources  Description  INTERVENTIONS:  - Identify barriers to discharge w/patient and caregiver  - Arrange for needed discharge resources and transportation as appropriate  - Identify discharge learning needs (meds, wound care, etc )  - Arrange for interpretive services to assist at discharge as needed  - Refer to Case Management Department for coordinating discharge planning if the patient needs post-hospital services based on physician/advanced practitioner order or complex needs related to functional status, cognitive ability, or social support system  Outcome: Progressing     Problem: Knowledge Deficit  Goal: Patient/family/caregiver demonstrates understanding of disease process, treatment plan, medications, and discharge instructions  Description  Complete learning assessment and assess knowledge base    Interventions:  - Provide teaching at level of understanding  - Provide teaching via preferred learning methods  Outcome: Progressing

## 2020-04-03 NOTE — NURSING NOTE
Pt sleeping, easily woken  Lunch provided  Denies SOB while at rest, states " only when I move around"  Pt remains on 4L O2 via NC, SaO2 909-93%  Denies cough  Call bell in reach  Will continue to monitor

## 2020-04-03 NOTE — PROGRESS NOTES
Progress Note - Lee Mancera 1950, 77 y o  male MRN: [id number]    Unit/Bed#:  -01 Encounter: [id number]    Primary Care Provider: No primary care provider on file  Date and time admitted to hospital: 4/1/2020 11:02 AM        Pneumonia due to COVID-19 virus  Assessment & Plan  · Patient presented from [facility]/[address] with complaint of increasing short of breath for last 2 days  · Positive fever, hypoxia and SOB/cough  · No lymphopenia  · Negative for influenza  · Continue oxygen  ·   · CXR: Somewhat technically limited examination demonstrating parenchymal pattern in the well-visualized mid and lower lung zones that is nonspecific but can be seen in the setting of viral pneumonia  · Continue plaquenil, azithromycin, rocephin, vitamin C, zinc and atorvastatin, and solumedrol  · continues to require 4L oxygen via nasal canula, breathing comfortably, does not c/o worsening dyspnea, no increased work of breathing    * Acute respiratory failure with hypoxia (HCC)  Assessment & Plan  · Secondary to COVID-19  · Still on oxygen (4L) which has not increased in need since admission  · Monitor O2 sat closely- if O2 sat drops or O2 increase is needed will discuss with ICU  · Continue plaquenil 200 mg BID x 8 total doses (already received 400 mg PO BID x 2 doses)  · Started on azithromycin, rocephin, vit c, zinc and atorvastatin and IV solumedrol  · Will continue telemetry, normal QT       COVID-19 positive  Assessment & Plan  · Airborne/droplet isolation  · Supportive care         VTE Pharmacologic Prophylaxis:   Pharmacologic: Enoxaparin (Lovenox)  Mechanical VTE Prophylaxis in Place: Yes    Patient Centered Rounds: I have performed bedside rounds with nursing staff today  Discussions with Specialists or Other Care Team Provider: no    Education and Discussions with Family / Patient: patient    Time Spent for Care: 30 minutes    More than 50% of total time spent on counseling and coordination of care as described above  Current Length of Stay: 2 day(s)    Current Patient Status: Inpatient   Certification Statement: The patient will continue to require additional inpatient hospital stay due to resp failure with hypoxia    Discharge Plan: pending progress    Code Status: Level 1 - Full Code      Subjective:   patinet continue stroke are 4 L oxygen via nasal cannula, oxygenating at 92%  However does not complain of short of breath or increased work of breathing  Objective:     Vitals:   Temp (24hrs), Av °F (36 7 °C), Min:96 7 °F (35 9 °C), Max:98 8 °F (37 1 °C)    Temp:  [96 7 °F (35 9 °C)-98 8 °F (37 1 °C)] 96 7 °F (35 9 °C)  HR:  [77-92] 77  Resp:  [20] 20  BP: (110-115)/(70-82) 115/82  SpO2:  [82 %-98 %] 82 %  Body mass index is 24 82 kg/m²  Input and Output Summary (last 24 hours): Intake/Output Summary (Last 24 hours) at 4/3/2020 0857  Last data filed at 2020 1700  Gross per 24 hour   Intake 1800 ml   Output    Net 1800 ml       Physical Exam:     Physical Exam   Constitutional: He appears well-developed and well-nourished  HENT:   Head: Normocephalic and atraumatic  Right Ear: External ear normal    Left Ear: External ear normal    Nose: Nose normal    Pulmonary/Chest: Effort normal and breath sounds normal  No stridor  No respiratory distress  Breathing comfortably with nasal canula 4 L oxygen  Neurological: He is alert  Skin: Skin is dry            Additional Data:     Labs:    Results from last 7 days   Lab Units 20  1135   WBC Thousand/uL 5 70   HEMOGLOBIN g/dL 13 5   HEMATOCRIT % 40 1*   PLATELETS Thousands/uL 201   NEUTROS PCT % 80*   LYMPHS PCT % 12*   MONOS PCT % 4   EOS PCT % 2     Results from last 7 days   Lab Units 20  1135   SODIUM mmol/L 136*   POTASSIUM mmol/L 4 0   CHLORIDE mmol/L 104   CO2 mmol/L 25   BUN mg/dL 19   CREATININE mg/dL 1 21   ANION GAP mmol/L 7   CALCIUM mg/dL 8 3*   ALBUMIN g/dL 3 7   TOTAL BILIRUBIN mg/dL 0 60   ALK PHOS U/L 40*   ALT U/L 31   AST U/L 40   GLUCOSE RANDOM mg/dL 106*                 Results from last 7 days   Lab Units 04/01/20  1253 04/01/20  1135   LACTIC ACID mmol/L  --  1 3   PROCALCITONIN ng/ml <0 05  --         * I Have Reviewed All Lab Data Listed Above  * Additional Pertinent Lab Tests Reviewed: Jayesh Miller Admission Reviewed       Recent Cultures (last 7 days):     Results from last 7 days   Lab Units 04/01/20  1622 04/01/20  1158 04/01/20  1135   BLOOD CULTURE   --  No Growth at 24 hrs  No Growth at 24 hrs  LEGIONELLA URINARY ANTIGEN  Negative  --   --        Last 24 Hours Medication List:     Current Facility-Administered Medications:  acetaminophen 650 mg Oral Q6H PRN SARA Ulrich    ascorbic acid 1,000 mg Oral BID Noy Dimas MD    atorvastatin 40 mg Oral Daily With Juice Epps MD    azithromycin 250 mg Oral Q24H Tay Rey MD    cefTRIAXone 1,000 mg Intravenous Q24H Noy Dimas MD Last Rate: 1,000 mg (04/02/20 2017)   enoxaparin 40 mg Subcutaneous Daily Noy Dimas MD    hydroxychloroquine 200 mg Oral BID Noy Dimas MD    methylPREDNISolone sodium succinate 80 mg Intravenous Daily Noy Dimas MD    zinc sulfate 220 mg Oral Daily Noy Dimas MD         Today, Patient Was Seen By: Noy Dimas MD    ** Please Note: Dictation voice to text software may have been used in the creation of this document   **

## 2020-04-03 NOTE — PLAN OF CARE
Problem: Potential for Falls  Goal: Patient will remain free of falls  Description  INTERVENTIONS:  - Assess patient frequently for physical needs  -  Identify cognitive and physical deficits and behaviors that affect risk of falls    -  [place] fall precautions as indicated by assessment   - Educate patient/family on patient safety including physical limitations  - Instruct patient to call for assistance with activity based on assessment  - Modify environment to reduce risk of injury  - Consider OT/PT consult to assist with strengthening/mobility  Outcome: Progressing     Problem: PAIN - ADULT  Goal: Verbalizes/displays adequate comfort level or baseline comfort level  Description  Interventions:  - Encourage patient to monitor pain and request assistance  - Assess pain using appropriate pain scale  - Administer analgesics based on type and severity of pain and evaluate response  - Implement non-pharmacological measures as appropriate and evaluate response  - Consider cultural and social influences on pain and pain management  - Notify physician/advanced practitioner if interventions unsuccessful or patient reports new pain  Outcome: Progressing     Problem: INFECTION - ADULT  Goal: Absence or prevention of progression during hospitalization  Description  INTERVENTIONS:  - Assess and monitor for signs and symptoms of infection  - Monitor lab/diagnostic results  - Monitor all insertion sites, i e  indwelling lines, tubes, and drains  - Monitor endotracheal if appropriate and nasal secretions for changes in amount and color  - [place] appropriate cooling/warming therapies per order  - Administer medications as ordered  - Instruct and encourage patient and family to use good hand hygiene technique  - Identify and instruct in appropriate isolation precautions for identified infection/condition  Outcome: Progressing  Goal: Absence of fever/infection during neutropenic period  Description  INTERVENTIONS:  - Monitor WBC    Outcome: Progressing     Problem: SAFETY ADULT  Goal: Patient will remain free of falls  Description  INTERVENTIONS:  - Assess patient frequently for physical needs  -  Identify cognitive and physical deficits and behaviors that affect risk of falls    -  [place] fall precautions as indicated by assessment   - Educate patient/family on patient safety including physical limitations  - Instruct patient to call for assistance with activity based on assessment  - Modify environment to reduce risk of injury  - Consider OT/PT consult to assist with strengthening/mobility  Outcome: Progressing  Goal: Maintain or return to baseline ADL function  Description  INTERVENTIONS:  -  Assess patient's ability to carry out ADLs; assess patient's baseline for ADL function and identify physical deficits which impact ability to perform ADLs (bathing, care of mouth/teeth, toileting, grooming, dressing, etc )  - Assess/evaluate cause of self-care deficits   - Assess range of motion  - Assess patient's mobility; develop plan if impaired  - Assess patient's need for assistive devices and provide as appropriate  - Encourage maximum independence but intervene and supervise when necessary  - Involve family in performance of ADLs  - Assess for home care needs following discharge   - Consider OT consult to assist with ADL evaluation and planning for discharge  - Provide patient education as appropriate  Outcome: Progressing  Goal: Maintain or return mobility status to optimal level  Description  INTERVENTIONS:  - Assess patient's baseline mobility status (ambulation, transfers, stairs, etc )    - Identify cognitive and physical deficits and behaviors that affect mobility  - Identify mobility aids required to assist with transfers and/or ambulation (gait belt, sit-to-stand, lift, walker, cane, etc )  - [place] fall precautions as indicated by assessment  - Record patient progress and toleration of activity level on Mobility SBAR; progress patient to next Phase/Stage  - Instruct patient to call for assistance with activity based on assessment  - Consider rehabilitation consult to assist with strengthening/weightbearing, etc   Outcome: Progressing     Problem: DISCHARGE PLANNING  Goal: Discharge to home or other facility with appropriate resources  Description  INTERVENTIONS:  - Identify barriers to discharge w/patient and caregiver  - Arrange for needed discharge resources and transportation as appropriate  - Identify discharge learning needs (meds, wound care, etc )  - Arrange for interpretive services to assist at discharge as needed  - Refer to Case Management Department for coordinating discharge planning if the patient needs post-hospital services based on physician/advanced practitioner order or complex needs related to functional status, cognitive ability, or social support system  Outcome: Progressing     Problem: Knowledge Deficit  Goal: Patient/family/caregiver demonstrates understanding of disease process, treatment plan, medications, and discharge instructions  Description  Complete learning assessment and assess knowledge base    Interventions:  - Provide teaching at level of understanding  - Provide teaching via preferred learning methods  Outcome: Progressing

## 2020-04-03 NOTE — ASSESSMENT & PLAN NOTE
· Patient presented from [facility]/[address] with complaint of increasing short of breath for last 2 days  · Positive fever, hypoxia and SOB/cough  · No lymphopenia  · Negative for influenza  · Continue oxygen  ·   · CXR: Somewhat technically limited examination demonstrating parenchymal pattern in the well-visualized mid and lower lung zones that is nonspecific but can be seen in the setting of viral pneumonia    · Continue plaquenil, azithromycin, rocephin, vitamin C, zinc and atorvastatin, and solumedrol  · continues to require 4L oxygen via nasal canula, breathing comfortably, does not c/o worsening dyspnea, no increased work of breathing

## 2020-04-03 NOTE — ASSESSMENT & PLAN NOTE
· Secondary to COVID-19  · Still on oxygen (4L) which has not increased in need since admission  · Monitor O2 sat closely- if O2 sat drops or O2 increase is needed will discuss with ICU  · Continue plaquenil 200 mg BID x 8 total doses (already received 400 mg PO BID x 2 doses)  · Started on azithromycin, rocephin, vit c, zinc and atorvastatin and IV solumedrol  · Will continue telemetry, normal QT

## 2020-04-04 PROCEDURE — 99232 SBSQ HOSP IP/OBS MODERATE 35: CPT | Performed by: FAMILY MEDICINE

## 2020-04-04 RX ORDER — METHYLPREDNISOLONE SODIUM SUCCINATE 125 MG/2ML
60 INJECTION, POWDER, LYOPHILIZED, FOR SOLUTION INTRAMUSCULAR; INTRAVENOUS DAILY
Status: DISCONTINUED | OUTPATIENT
Start: 2020-04-05 | End: 2020-04-05

## 2020-04-04 RX ORDER — POLYETHYLENE GLYCOL 3350 17 G/17G
17 POWDER, FOR SOLUTION ORAL DAILY PRN
Status: DISCONTINUED | OUTPATIENT
Start: 2020-04-04 | End: 2020-04-07 | Stop reason: HOSPADM

## 2020-04-04 RX ADMIN — OXYCODONE HYDROCHLORIDE AND ACETAMINOPHEN 1000 MG: 500 TABLET ORAL at 16:48

## 2020-04-04 RX ADMIN — ENOXAPARIN SODIUM 40 MG: 40 INJECTION SUBCUTANEOUS at 09:13

## 2020-04-04 RX ADMIN — OXYCODONE HYDROCHLORIDE AND ACETAMINOPHEN 1000 MG: 500 TABLET ORAL at 09:13

## 2020-04-04 RX ADMIN — AZITHROMYCIN 250 MG: 250 TABLET, FILM COATED ORAL at 06:06

## 2020-04-04 RX ADMIN — ZINC SULFATE 220 MG (50 MG) CAPSULE 220 MG: CAPSULE at 09:13

## 2020-04-04 RX ADMIN — METHYLPREDNISOLONE SODIUM SUCCINATE 80 MG: 125 INJECTION, POWDER, FOR SOLUTION INTRAMUSCULAR; INTRAVENOUS at 09:13

## 2020-04-04 RX ADMIN — HYDROXYCHLOROQUINE SULFATE 200 MG: 200 TABLET, FILM COATED ORAL at 09:13

## 2020-04-04 RX ADMIN — HYDROXYCHLOROQUINE SULFATE 200 MG: 200 TABLET, FILM COATED ORAL at 16:48

## 2020-04-04 RX ADMIN — POLYETHYLENE GLYCOL 3350 17 G: 17 POWDER, FOR SOLUTION ORAL at 15:43

## 2020-04-04 RX ADMIN — ATORVASTATIN CALCIUM 40 MG: 40 TABLET, FILM COATED ORAL at 15:43

## 2020-04-04 RX ADMIN — CEFTRIAXONE 1000 MG: 1 INJECTION, SOLUTION INTRAVENOUS at 18:17

## 2020-04-04 NOTE — PLAN OF CARE
Problem: Potential for Falls  Goal: Patient will remain free of falls  Description  INTERVENTIONS:  - Assess patient frequently for physical needs  -  Identify cognitive and physical deficits and behaviors that affect risk of falls    -  [place] fall precautions as indicated by assessment   - Educate patient/family on patient safety including physical limitations  - Instruct patient to call for assistance with activity based on assessment  - Modify environment to reduce risk of injury  - Consider OT/PT consult to assist with strengthening/mobility  Outcome: Progressing     Problem: PAIN - ADULT  Goal: Verbalizes/displays adequate comfort level or baseline comfort level  Description  Interventions:  - Encourage patient to monitor pain and request assistance  - Assess pain using appropriate pain scale  - Administer analgesics based on type and severity of pain and evaluate response  - Implement non-pharmacological measures as appropriate and evaluate response  - Consider cultural and social influences on pain and pain management  - Notify physician/advanced practitioner if interventions unsuccessful or patient reports new pain  Outcome: Progressing     Problem: INFECTION - ADULT  Goal: Absence or prevention of progression during hospitalization  Description  INTERVENTIONS:  - Assess and monitor for signs and symptoms of infection  - Monitor lab/diagnostic results  - Monitor all insertion sites, i e  indwelling lines, tubes, and drains  - Monitor endotracheal if appropriate and nasal secretions for changes in amount and color  - [place] appropriate cooling/warming therapies per order  - Administer medications as ordered  - Instruct and encourage patient and family to use good hand hygiene technique  - Identify and instruct in appropriate isolation precautions for identified infection/condition  Outcome: Progressing  Goal: Absence of fever/infection during neutropenic period  Description  INTERVENTIONS:  - Monitor WBC    Outcome: Progressing     Problem: SAFETY ADULT  Goal: Patient will remain free of falls  Description  INTERVENTIONS:  - Assess patient frequently for physical needs  -  Identify cognitive and physical deficits and behaviors that affect risk of falls  -  [place] fall precautions as indicated by assessment   - Educate patient/family on patient safety including physical limitations  - Instruct patient to call for assistance with activity based on assessment  - Modify environment to reduce risk of injury  - Consider OT/PT consult to assist with strengthening/mobility  Outcome: Progressing  Goal: Maintain or return to baseline ADL function  Description  INTERVENTIONS:  - Assess patient frequently for physical needs  -  Identify cognitive and physical deficits and behaviors that affect risk of falls    -  [place] fall precautions as indicated by assessment   - Educate patient/family on patient safety including physical limitations  - Instruct patient to call for assistance with activity based on assessment  - Modify environment to reduce risk of injury  - Consider OT/PT consult to assist with strengthening/mobility  Outcome: Progressing  Goal: Maintain or return mobility status to optimal level  Description  INTERVENTIONS:  -  Assess patient's ability to carry out ADLs; assess patient's baseline for ADL function and identify physical deficits which impact ability to perform ADLs (bathing, care of mouth/teeth, toileting, grooming, dressing, etc )  - Assess/evaluate cause of self-care deficits   - Assess range of motion  - Assess patient's mobility; develop plan if impaired  - Assess patient's need for assistive devices and provide as appropriate  - Encourage maximum independence but intervene and supervise when necessary  - Involve family in performance of ADLs  - Assess for home care needs following discharge   - Consider OT consult to assist with ADL evaluation and planning for discharge  - Provide patient education as appropriate  Outcome: Progressing     Problem: DISCHARGE PLANNING  Goal: Discharge to home or other facility with appropriate resources  Description  INTERVENTIONS:  - Identify barriers to discharge w/patient and caregiver  - Arrange for needed discharge resources and transportation as appropriate  - Identify discharge learning needs (meds, wound care, etc )  - Arrange for interpretive services to assist at discharge as needed  - Refer to Case Management Department for coordinating discharge planning if the patient needs post-hospital services based on physician/advanced practitioner order or complex needs related to functional status, cognitive ability, or social support system  Outcome: Progressing     Problem: Knowledge Deficit  Goal: Patient/family/caregiver demonstrates understanding of disease process, treatment plan, medications, and discharge instructions  Description  Complete learning assessment and assess knowledge base    Interventions:  - Provide teaching at level of understanding  - Provide teaching via preferred learning methods  Outcome: Progressing

## 2020-04-04 NOTE — ASSESSMENT & PLAN NOTE
· CXR: Somewhat technically limited examination demonstrating parenchymal pattern in the well-visualized mid and lower lung zones that is nonspecific but can be seen in the setting of viral pneumonia    · Continue plaquenil, azithromycin, rocephin, vitamin C, zinc and atorvastatin, and solumedrol  · continues to require 4L oxygen via nasal canula, breathing comfortably, does not c/o worsening dyspnea, no increased work of breathing  · He is significantly improving today, will need to start weaning off oxygen as able

## 2020-04-04 NOTE — ASSESSMENT & PLAN NOTE
· Secondary to COVID-19  · Continue plaquenil 200 mg BID x 8 total doses (already received 400 mg PO BID x 2 doses)  · Started on azithromycin, rocephin, vit c, zinc and atorvastatin and IV solumedrol  · Will slowly wean off Solu-Medrol  · Will continue telemetry, normal QT   · Patient is still on 4 L oxygen, however may be able to start weaning off

## 2020-04-04 NOTE — PROGRESS NOTES
Progress Note - Arcenio Montenegro 1950, 77 y o  male MRN: [id number]    Unit/Bed#:  -01 Encounter: [id number]    Primary Care Provider: No primary care provider on file  Date and time admitted to hospital: 4/1/2020 11:02 AM        Pneumonia due to COVID-19 virus  Assessment & Plan  · CXR: Somewhat technically limited examination demonstrating parenchymal pattern in the well-visualized mid and lower lung zones that is nonspecific but can be seen in the setting of viral pneumonia  · Continue plaquenil, azithromycin, rocephin, vitamin C, zinc and atorvastatin, and solumedrol  · continues to require 4L oxygen via nasal canula, breathing comfortably, does not c/o worsening dyspnea, no increased work of breathing  · He is significantly improving today, will need to start weaning off oxygen as able    * Acute respiratory failure with hypoxia (HCC)  Assessment & Plan  · Secondary to COVID-19  · Continue plaquenil 200 mg BID x 8 total doses (already received 400 mg PO BID x 2 doses)  · Started on azithromycin, rocephin, vit c, zinc and atorvastatin and IV solumedrol  · Will slowly wean off Solu-Medrol  · Will continue telemetry, normal QT   · Patient is still on 4 L oxygen, however may be able to start weaning off      COVID-19 positive  Assessment & Plan  · Airborne/droplet isolation  · Supportive care    VTE Pharmacologic Prophylaxis:   Pharmacologic: Enoxaparin (Lovenox)  Mechanical VTE Prophylaxis in Place: Yes    Patient Centered Rounds: I have performed bedside rounds with nursing staff today  Discussions with Specialists or Other Care Team Provider: no    Education and Discussions with Family / Patient: patient    Time Spent for Care: 30 minutes  More than 50% of total time spent on counseling and coordination of care as described above      Current Length of Stay: 3 day(s)    Current Patient Status: Inpatient   Certification Statement: The patient will continue to require additional inpatient hospital stay due to still requiring oxygen    Discharge Plan: home when stable    Code Status: Level 1 - Full Code      Subjective: Interview was completed over the phone  Discussed with nursing, patient does not have any increasing requirement of oxygen, breathing comfortably on room air  On my conversation over the phone with the patient, he states he is feeling very well  No short of breath  No fatigue no worsening myalgia  Patient is breathing comfortably on 4 L  Patient is speaking in full sentences  Objective:     Vitals:   Temp (24hrs), Av 2 °F (36 8 °C), Min:98 1 °F (36 7 °C), Max:98 3 °F (36 8 °C)    Temp:  [98 1 °F (36 7 °C)-98 3 °F (36 8 °C)] 98 3 °F (36 8 °C)  HR:  [71-80] 71  Resp:  [16-20] 20  BP: (115-129)/(78-91) 116/84  SpO2:  [90 %-93 %] 90 %  Body mass index is 24 82 kg/m²  Input and Output Summary (last 24 hours): Intake/Output Summary (Last 24 hours) at 2020 1015  Last data filed at 4/3/2020 1700  Gross per 24 hour   Intake 720 ml   Output    Net 720 ml       Physical Exam:     Physical Exam   Nursing note and vitals reviewed  I interviewed the patient over the telephone  Patient is speaking in full sentences  No gasping for air her  Normal mentation  Discussed with nurse  No increased work of breathing and breathing comfortably         Additional Data:     Labs:    Results from last 7 days   Lab Units 20  1135   WBC Thousand/uL 5 70   HEMOGLOBIN g/dL 13 5   HEMATOCRIT % 40 1*   PLATELETS Thousands/uL 201   NEUTROS PCT % 80*   LYMPHS PCT % 12*   MONOS PCT % 4   EOS PCT % 2     Results from last 7 days   Lab Units 20  1135   SODIUM mmol/L 136*   POTASSIUM mmol/L 4 0   CHLORIDE mmol/L 104   CO2 mmol/L 25   BUN mg/dL 19   CREATININE mg/dL 1 21   ANION GAP mmol/L 7   CALCIUM mg/dL 8 3*   ALBUMIN g/dL 3 7   TOTAL BILIRUBIN mg/dL 0 60   ALK PHOS U/L 40*   ALT U/L 31   AST U/L 40   GLUCOSE RANDOM mg/dL 106*                 Results from last 7 days   Lab Units 04/01/20  1253 04/01/20  1135   LACTIC ACID mmol/L  --  1 3   PROCALCITONIN ng/ml <0 05  --            * I Have Reviewed All Lab Data Listed Above  * Additional Pertinent Lab Tests Reviewed: Jayesh Miller Admission Reviewed     Recent Cultures (last 7 days):     Results from last 7 days   Lab Units 04/01/20  1622 04/01/20  1158 04/01/20  1135   BLOOD CULTURE   --  No Growth at 48 hrs  No Growth at 48 hrs  LEGIONELLA URINARY ANTIGEN  Negative  --   --        Last 24 Hours Medication List:     Current Facility-Administered Medications:  acetaminophen 650 mg Oral Q6H PRN SARA Ulrich    ascorbic acid 1,000 mg Oral BID Paul Castellanos MD    atorvastatin 40 mg Oral Daily With Pb Sawyer MD    azithromycin 250 mg Oral Q24H Paul Castellanos MD    cefTRIAXone 1,000 mg Intravenous Q24H Paul Castellanos MD Last Rate: Stopped (04/03/20 1917)   enoxaparin 40 mg Subcutaneous Daily Paul Castellanos MD    hydroxychloroquine 200 mg Oral BID Paul Castellanos MD    [START ON 4/5/2020] methylPREDNISolone sodium succinate 60 mg Intravenous Daily Paul Castellanos MD    zinc sulfate 220 mg Oral Daily Paul Castellanos MD         Today, Patient Was Seen By: Paul Castellanos MD    ** Please Note: Dictation voice to text software may have been used in the creation of this document   **

## 2020-04-04 NOTE — NURSING NOTE
Pt c/o constipation, states last BM "2 days ago"  MD made aware, miralax ordred and given    Pt O2 decreased to 3L via NC

## 2020-04-04 NOTE — PLAN OF CARE
Problem: Potential for Falls  Goal: Patient will remain free of falls  Description  INTERVENTIONS:  - Assess patient frequently for physical needs  -  Identify cognitive and physical deficits and behaviors that affect risk of falls    -  [place] fall precautions as indicated by assessment   - Educate patient/family on patient safety including physical limitations  - Instruct patient to call for assistance with activity based on assessment  - Modify environment to reduce risk of injury  - Consider OT/PT consult to assist with strengthening/mobility  Outcome: Progressing     Problem: PAIN - ADULT  Goal: Verbalizes/displays adequate comfort level or baseline comfort level  Description  Interventions:  - Encourage patient to monitor pain and request assistance  - Assess pain using appropriate pain scale  - Administer analgesics based on type and severity of pain and evaluate response  - Implement non-pharmacological measures as appropriate and evaluate response  - Consider cultural and social influences on pain and pain management  - Notify physician/advanced practitioner if interventions unsuccessful or patient reports new pain  Outcome: Progressing     Problem: INFECTION - ADULT  Goal: Absence or prevention of progression during hospitalization  Description  INTERVENTIONS:  - Assess and monitor for signs and symptoms of infection  - Monitor lab/diagnostic results  - Monitor all insertion sites, i e  indwelling lines, tubes, and drains  - Monitor endotracheal if appropriate and nasal secretions for changes in amount and color  - [place] appropriate cooling/warming therapies per order  - Administer medications as ordered  - Instruct and encourage patient and family to use good hand hygiene technique  - Identify and instruct in appropriate isolation precautions for identified infection/condition  Outcome: Progressing  Goal: Absence of fever/infection during neutropenic period  Description  INTERVENTIONS:  - Monitor WBC    Outcome: Progressing     Problem: SAFETY ADULT  Goal: Patient will remain free of falls  Description  INTERVENTIONS:  - Assess patient frequently for physical needs  -  Identify cognitive and physical deficits and behaviors that affect risk of falls    -  [place] fall precautions as indicated by assessment   - Educate patient/family on patient safety including physical limitations  - Instruct patient to call for assistance with activity based on assessment  - Modify environment to reduce risk of injury  - Consider OT/PT consult to assist with strengthening/mobility  Outcome: Progressing  Goal: Maintain or return to baseline ADL function  Description  INTERVENTIONS:  -  Assess patient's ability to carry out ADLs; assess patient's baseline for ADL function and identify physical deficits which impact ability to perform ADLs (bathing, care of mouth/teeth, toileting, grooming, dressing, etc )  - Assess/evaluate cause of self-care deficits   - Assess range of motion  - Assess patient's mobility; develop plan if impaired  - Assess patient's need for assistive devices and provide as appropriate  - Encourage maximum independence but intervene and supervise when necessary  - Involve family in performance of ADLs  - Assess for home care needs following discharge   - Consider OT consult to assist with ADL evaluation and planning for discharge  - Provide patient education as appropriate  Outcome: Progressing  Goal: Maintain or return mobility status to optimal level  Description  INTERVENTIONS:  - Assess patient's baseline mobility status (ambulation, transfers, stairs, etc )    - Identify cognitive and physical deficits and behaviors that affect mobility  - Identify mobility aids required to assist with transfers and/or ambulation (gait belt, sit-to-stand, lift, walker, cane, etc )  - [place] fall precautions as indicated by assessment  - Record patient progress and toleration of activity level on Mobility SBAR; progress patient to next Phase/Stage  - Instruct patient to call for assistance with activity based on assessment  - Consider rehabilitation consult to assist with strengthening/weightbearing, etc   Outcome: Progressing     Problem: DISCHARGE PLANNING  Goal: Discharge to home or other facility with appropriate resources  Description  INTERVENTIONS:  - Identify barriers to discharge w/patient and caregiver  - Arrange for needed discharge resources and transportation as appropriate  - Identify discharge learning needs (meds, wound care, etc )  - Arrange for interpretive services to assist at discharge as needed  - Refer to Case Management Department for coordinating discharge planning if the patient needs post-hospital services based on physician/advanced practitioner order or complex needs related to functional status, cognitive ability, or social support system  Outcome: Progressing     Problem: Knowledge Deficit  Goal: Patient/family/caregiver demonstrates understanding of disease process, treatment plan, medications, and discharge instructions  Description  Complete learning assessment and assess knowledge base    Interventions:  - Provide teaching at level of understanding  - Provide teaching via preferred learning methods  Outcome: Progressing

## 2020-04-05 LAB
ANION GAP SERPL CALCULATED.3IONS-SCNC: 6 MMOL/L (ref 5–14)
BUN SERPL-MCNC: 18 MG/DL (ref 5–25)
CALCIUM SERPL-MCNC: 8.3 MG/DL (ref 8.4–10.2)
CHLORIDE SERPL-SCNC: 106 MMOL/L (ref 97–108)
CO2 SERPL-SCNC: 26 MMOL/L (ref 22–30)
CREAT SERPL-MCNC: 1.03 MG/DL (ref 0.7–1.5)
ERYTHROCYTE [DISTWIDTH] IN BLOOD BY AUTOMATED COUNT: 13.3 %
GFR SERPL CREATININE-BSD FRML MDRD: 74 ML/MIN/1.73SQ M
GLUCOSE SERPL-MCNC: 115 MG/DL (ref 70–99)
HCT VFR BLD AUTO: 39.1 % (ref 41–53)
HGB BLD-MCNC: 12.7 G/DL (ref 13.5–17.5)
MCH RBC QN AUTO: 28.1 PG (ref 26–34)
MCHC RBC AUTO-ENTMCNC: 32.5 G/DL (ref 31–36)
MCV RBC AUTO: 86 FL (ref 80–100)
PLATELET # BLD AUTO: 435 THOUSANDS/UL (ref 150–450)
PMV BLD AUTO: 8.8 FL (ref 8.9–12.7)
POTASSIUM SERPL-SCNC: 4.2 MMOL/L (ref 3.6–5)
RBC # BLD AUTO: 4.52 MILLION/UL (ref 4.5–5.9)
SODIUM SERPL-SCNC: 138 MMOL/L (ref 137–147)
WBC # BLD AUTO: 12.4 THOUSAND/UL (ref 4.5–11)

## 2020-04-05 PROCEDURE — 80048 BASIC METABOLIC PNL TOTAL CA: CPT | Performed by: FAMILY MEDICINE

## 2020-04-05 PROCEDURE — 99232 SBSQ HOSP IP/OBS MODERATE 35: CPT | Performed by: FAMILY MEDICINE

## 2020-04-05 PROCEDURE — 85027 COMPLETE CBC AUTOMATED: CPT | Performed by: FAMILY MEDICINE

## 2020-04-05 RX ORDER — METHYLPREDNISOLONE SODIUM SUCCINATE 40 MG/ML
30 INJECTION, POWDER, LYOPHILIZED, FOR SOLUTION INTRAMUSCULAR; INTRAVENOUS DAILY
Status: DISCONTINUED | OUTPATIENT
Start: 2020-04-06 | End: 2020-04-07

## 2020-04-05 RX ADMIN — AZITHROMYCIN 250 MG: 250 TABLET, FILM COATED ORAL at 05:12

## 2020-04-05 RX ADMIN — ZINC SULFATE 220 MG (50 MG) CAPSULE 220 MG: CAPSULE at 09:15

## 2020-04-05 RX ADMIN — OXYCODONE HYDROCHLORIDE AND ACETAMINOPHEN 1000 MG: 500 TABLET ORAL at 09:14

## 2020-04-05 RX ADMIN — OXYCODONE HYDROCHLORIDE AND ACETAMINOPHEN 1000 MG: 500 TABLET ORAL at 16:48

## 2020-04-05 RX ADMIN — HYDROXYCHLOROQUINE SULFATE 200 MG: 200 TABLET, FILM COATED ORAL at 16:48

## 2020-04-05 RX ADMIN — HYDROXYCHLOROQUINE SULFATE 200 MG: 200 TABLET, FILM COATED ORAL at 09:14

## 2020-04-05 RX ADMIN — METHYLPREDNISOLONE SODIUM SUCCINATE 60 MG: 125 INJECTION, POWDER, FOR SOLUTION INTRAMUSCULAR; INTRAVENOUS at 09:14

## 2020-04-05 RX ADMIN — ENOXAPARIN SODIUM 40 MG: 40 INJECTION SUBCUTANEOUS at 09:14

## 2020-04-05 RX ADMIN — CEFTRIAXONE 1000 MG: 1 INJECTION, SOLUTION INTRAVENOUS at 21:00

## 2020-04-05 RX ADMIN — ATORVASTATIN CALCIUM 40 MG: 40 TABLET, FILM COATED ORAL at 16:48

## 2020-04-05 NOTE — PLAN OF CARE
Problem: Potential for Falls  Goal: Patient will remain free of falls  Description  INTERVENTIONS:  - Assess patient frequently for physical needs  -  Identify cognitive and physical deficits and behaviors that affect risk of falls    -  [place] fall precautions as indicated by assessment   - Educate patient/family on patient safety including physical limitations  - Instruct patient to call for assistance with activity based on assessment  - Modify environment to reduce risk of injury  - Consider OT/PT consult to assist with strengthening/mobility  Outcome: Progressing     Problem: PAIN - ADULT  Goal: Verbalizes/displays adequate comfort level or baseline comfort level  Description  Interventions:  - Encourage patient to monitor pain and request assistance  - Assess pain using appropriate pain scale  - Administer analgesics based on type and severity of pain and evaluate response  - Implement non-pharmacological measures as appropriate and evaluate response  - Consider cultural and social influences on pain and pain management  - Notify physician/advanced practitioner if interventions unsuccessful or patient reports new pain  Outcome: Progressing     Problem: INFECTION - ADULT  Goal: Absence or prevention of progression during hospitalization  Description  INTERVENTIONS:  - Assess and monitor for signs and symptoms of infection  - Monitor lab/diagnostic results  - Monitor all insertion sites, i e  indwelling lines, tubes, and drains  - Monitor endotracheal if appropriate and nasal secretions for changes in amount and color  - [place] appropriate cooling/warming therapies per order  - Administer medications as ordered  - Instruct and encourage patient and family to use good hand hygiene technique  - Identify and instruct in appropriate isolation precautions for identified infection/condition  Outcome: Progressing  Goal: Absence of fever/infection during neutropenic period  Description  INTERVENTIONS:  - Monitor WBC    Outcome: Progressing     Problem: SAFETY ADULT  Goal: Patient will remain free of falls  Description  INTERVENTIONS:  - Assess patient frequently for physical needs  -  Identify cognitive and physical deficits and behaviors that affect risk of falls    -  [place] fall precautions as indicated by assessment   - Educate patient/family on patient safety including physical limitations  - Instruct patient to call for assistance with activity based on assessment  - Modify environment to reduce risk of injury  - Consider OT/PT consult to assist with strengthening/mobility  Outcome: Progressing  Goal: Maintain or return to baseline ADL function  Description  INTERVENTIONS:  -  Assess patient's ability to carry out ADLs; assess patient's baseline for ADL function and identify physical deficits which impact ability to perform ADLs (bathing, care of mouth/teeth, toileting, grooming, dressing, etc )  - Assess/evaluate cause of self-care deficits   - Assess range of motion  - Assess patient's mobility; develop plan if impaired  - Assess patient's need for assistive devices and provide as appropriate  - Encourage maximum independence but intervene and supervise when necessary  - Involve family in performance of ADLs  - Assess for home care needs following discharge   - Consider OT consult to assist with ADL evaluation and planning for discharge  - Provide patient education as appropriate  Outcome: Progressing  Goal: Maintain or return mobility status to optimal level  Description  INTERVENTIONS:  - Assess patient's baseline mobility status (ambulation, transfers, stairs, etc )    - Identify cognitive and physical deficits and behaviors that affect mobility  - Identify mobility aids required to assist with transfers and/or ambulation (gait belt, sit-to-stand, lift, walker, cane, etc )  - Outing fall precautions as indicated by assessment  - Record patient progress and toleration of activity level on Mobility SBAR; progress patient to next Phase/Stage  - Instruct patient to call for assistance with activity based on assessment  - Consider rehabilitation consult to assist with strengthening/weightbearing, etc   Outcome: Progressing     Problem: DISCHARGE PLANNING  Goal: Discharge to home or other facility with appropriate resources  Description  INTERVENTIONS:  - Identify barriers to discharge w/patient and caregiver  - Arrange for needed discharge resources and transportation as appropriate  - Identify discharge learning needs (meds, wound care, etc )  - Arrange for interpretive services to assist at discharge as needed  - Refer to Case Management Department for coordinating discharge planning if the patient needs post-hospital services based on physician/advanced practitioner order or complex needs related to functional status, cognitive ability, or social support system  Outcome: Progressing     Problem: Knowledge Deficit  Goal: Patient/family/caregiver demonstrates understanding of disease process, treatment plan, medications, and discharge instructions  Description  Complete learning assessment and assess knowledge base    Interventions:  - Provide teaching at level of understanding  - Provide teaching via preferred learning methods  Outcome: Progressing

## 2020-04-05 NOTE — PROGRESS NOTES
Progress Note - Aman Zaragoza 1950, 77 y o  male MRN: [id number]    Unit/Bed#:  -01 Encounter: [id number]    Primary Care Provider: No primary care provider on file  Date and time admitted to hospital: 4/1/2020 11:02 AM        Pneumonia due to COVID-19 virus  Assessment & Plan  · CXR: Somewhat technically limited examination demonstrating parenchymal pattern in the well-visualized mid and lower lung zones that is nonspecific but can be seen in the setting of viral pneumonia  · Continue plaquenil, azithromycin, rocephin, vitamin C, zinc and atorvastatin, and solumedrol  · continues to require 4L oxygen via nasal canula, breathing comfortably, does not c/o worsening dyspnea, no increased work of breathing  · He is significantly improving today, will need to start weaning off oxygen as able    * Acute respiratory failure with hypoxia (HCC)  Assessment & Plan  · Secondary to COVID-19  · Continue plaquenil 200 mg BID x 8 total doses (already received 400 mg PO BID x 2 doses)  · Started on azithromycin, rocephin, vit c, zinc and atorvastatin and IV solumedrol  · Will slowly wean off Solu-Medrol  · Will continue telemetry, normal QT   · Patient is still on 4 L oxygen, however may be able to start weaning off      COVID-19 positive  Assessment & Plan  · Airborne/droplet isolation  · Supportive care           VTE Pharmacologic Prophylaxis:   Pharmacologic: Enoxaparin (Lovenox)  Mechanical VTE Prophylaxis in Place: Yes    Patient Centered Rounds: I have performed bedside rounds with nursing staff today  Discussions with Specialists or Other Care Team Provider: no    Education and Discussions with Family / Patient: patient    Time Spent for Care: 30 minutes  More than 50% of total time spent on counseling and coordination of care as described above      Current Length of Stay: 4 day(s)    Current Patient Status: Inpatient   Certification Statement: The patient will continue to require additional inpatient hospital stay due to requiring oxygen    Discharge Plan: home when stable    Code Status: Level 1 - Full Code      Subjective: Interviewed patient over the phone  No overnight events  Breathing comfortably with nasal canula, started weaning off oxygen, now at 3L    Objective:     Vitals:   Temp (24hrs), Av 8 °F (36 6 °C), Min:97 2 °F (36 2 °C), Max:98 3 °F (36 8 °C)    Temp:  [97 2 °F (36 2 °C)-98 3 °F (36 8 °C)] 97 2 °F (36 2 °C)  HR:  [69-71] 71  Resp:  [20] 20  BP: (116-135)/(84-93) 135/93  SpO2:  [90 %-96 %] 94 %  Body mass index is 24 82 kg/m²  Input and Output Summary (last 24 hours): Intake/Output Summary (Last 24 hours) at 2020 0658  Last data filed at 2020 0900  Gross per 24 hour   Intake 120 ml   Output    Net 120 ml       Physical Exam:     Physical Exam   I interviewed the patient over the telephone  Patient is speaking in full sentences  No gasping for air her  Normal mentation  Discussed with nurse  No increased work of breathing and breathing comfortably  Additional Data:     Labs:    Results from last 7 days   Lab Units 20  1135   WBC Thousand/uL 5 70   HEMOGLOBIN g/dL 13 5   HEMATOCRIT % 40 1*   PLATELETS Thousands/uL 201   NEUTROS PCT % 80*   LYMPHS PCT % 12*   MONOS PCT % 4   EOS PCT % 2     Results from last 7 days   Lab Units 20  0525 20  1135   SODIUM mmol/L 138 136*   POTASSIUM mmol/L 4 2 4 0   CHLORIDE mmol/L 106 104   CO2 mmol/L 26 25   BUN mg/dL 18 19   CREATININE mg/dL 1 03 1 21   ANION GAP mmol/L 6 7   CALCIUM mg/dL 8 3* 8 3*   ALBUMIN g/dL  --  3 7   TOTAL BILIRUBIN mg/dL  --  0 60   ALK PHOS U/L  --  40*   ALT U/L  --  31   AST U/L  --  40   GLUCOSE RANDOM mg/dL 115* 106*                 Results from last 7 days   Lab Units 20  1253 20  1135   LACTIC ACID mmol/L  --  1 3   PROCALCITONIN ng/ml <0 05  --         * I Have Reviewed All Lab Data Listed Above  * Additional Pertinent Lab Tests Reviewed:  All Labs For Current Hospital Admission Reviewed       Recent Cultures (last 7 days):     Results from last 7 days   Lab Units 04/01/20  1622 04/01/20  1158 04/01/20  1135   BLOOD CULTURE   --  No Growth at 72 hrs  No Growth at 72 hrs  LEGIONELLA URINARY ANTIGEN  Negative  --   --        Last 24 Hours Medication List:     Current Facility-Administered Medications:  acetaminophen 650 mg Oral Q6H PRN SARA Ulrich    ascorbic acid 1,000 mg Oral BID Krys Penn MD    atorvastatin 40 mg Oral Daily With Jorge Mcnally MD    azithromycin 250 mg Oral Q24H Tay Rey MD    cefTRIAXone 1,000 mg Intravenous Q24H Krys Penn MD Last Rate: 1,000 mg (04/04/20 1817)   enoxaparin 40 mg Subcutaneous Daily Krys Penn MD    hydroxychloroquine 200 mg Oral BID Krys Penn MD    methylPREDNISolone sodium succinate 60 mg Intravenous Daily Krys Penn MD    polyethylene glycol 17 g Oral Daily PRN Krys Penn MD    zinc sulfate 220 mg Oral Daily Krys Penn MD         Today, Patient Was Seen By: Krys Penn MD    ** Please Note: Dictation voice to text software may have been used in the creation of this document   **

## 2020-04-05 NOTE — PROGRESS NOTES
Patient continued to improve throughout the day  We were able to wean him off of oxygen completely  Currently he is saturating at 95% and above in room air  I will discontinue azithromycin, will continue plaquenil (last dose in AM)  Lower the dose of solumedrol  Will anticipate to switch him to oral prednisone to wean him off tomorrow

## 2020-04-06 LAB
ANION GAP SERPL CALCULATED.3IONS-SCNC: 7 MMOL/L (ref 5–14)
BACTERIA BLD CULT: NORMAL
BACTERIA BLD CULT: NORMAL
BUN SERPL-MCNC: 17 MG/DL (ref 5–25)
CALCIUM SERPL-MCNC: 8.2 MG/DL (ref 8.4–10.2)
CHLORIDE SERPL-SCNC: 106 MMOL/L (ref 97–108)
CO2 SERPL-SCNC: 24 MMOL/L (ref 22–30)
CREAT SERPL-MCNC: 0.98 MG/DL (ref 0.7–1.5)
ERYTHROCYTE [DISTWIDTH] IN BLOOD BY AUTOMATED COUNT: 13.1 %
GFR SERPL CREATININE-BSD FRML MDRD: 79 ML/MIN/1.73SQ M
GLUCOSE SERPL-MCNC: 101 MG/DL (ref 70–99)
HCT VFR BLD AUTO: 38.7 % (ref 41–53)
HGB BLD-MCNC: 12.9 G/DL (ref 13.5–17.5)
MCH RBC QN AUTO: 29.1 PG (ref 26–34)
MCHC RBC AUTO-ENTMCNC: 33.2 G/DL (ref 31–36)
MCV RBC AUTO: 87 FL (ref 80–100)
PLATELET # BLD AUTO: 461 THOUSANDS/UL (ref 150–450)
PMV BLD AUTO: 8.3 FL (ref 8.9–12.7)
POTASSIUM SERPL-SCNC: 4.1 MMOL/L (ref 3.6–5)
RBC # BLD AUTO: 4.42 MILLION/UL (ref 4.5–5.9)
SODIUM SERPL-SCNC: 137 MMOL/L (ref 137–147)
WBC # BLD AUTO: 10 THOUSAND/UL (ref 4.5–11)

## 2020-04-06 PROCEDURE — 99232 SBSQ HOSP IP/OBS MODERATE 35: CPT | Performed by: FAMILY MEDICINE

## 2020-04-06 PROCEDURE — 85027 COMPLETE CBC AUTOMATED: CPT | Performed by: FAMILY MEDICINE

## 2020-04-06 PROCEDURE — 80048 BASIC METABOLIC PNL TOTAL CA: CPT | Performed by: FAMILY MEDICINE

## 2020-04-06 RX ORDER — PREDNISONE 20 MG/1
20 TABLET ORAL DAILY
Status: DISCONTINUED | OUTPATIENT
Start: 2020-04-10 | End: 2020-04-07 | Stop reason: HOSPADM

## 2020-04-06 RX ORDER — PREDNISONE 20 MG/1
40 TABLET ORAL DAILY
Status: DISCONTINUED | OUTPATIENT
Start: 2020-04-07 | End: 2020-04-07 | Stop reason: HOSPADM

## 2020-04-06 RX ADMIN — ATORVASTATIN CALCIUM 40 MG: 40 TABLET, FILM COATED ORAL at 16:12

## 2020-04-06 RX ADMIN — CEFTRIAXONE 1000 MG: 1 INJECTION, SOLUTION INTRAVENOUS at 21:22

## 2020-04-06 RX ADMIN — METHYLPREDNISOLONE SODIUM SUCCINATE: 40 INJECTION, POWDER, FOR SOLUTION INTRAMUSCULAR; INTRAVENOUS at 08:28

## 2020-04-06 RX ADMIN — ZINC SULFATE 220 MG (50 MG) CAPSULE 220 MG: CAPSULE at 08:21

## 2020-04-06 RX ADMIN — ENOXAPARIN SODIUM 40 MG: 40 INJECTION SUBCUTANEOUS at 08:22

## 2020-04-06 RX ADMIN — HYDROXYCHLOROQUINE SULFATE 200 MG: 200 TABLET, FILM COATED ORAL at 08:21

## 2020-04-06 RX ADMIN — OXYCODONE HYDROCHLORIDE AND ACETAMINOPHEN 1000 MG: 500 TABLET ORAL at 08:21

## 2020-04-06 RX ADMIN — OXYCODONE HYDROCHLORIDE AND ACETAMINOPHEN 1000 MG: 500 TABLET ORAL at 17:34

## 2020-04-06 NOTE — ASSESSMENT & PLAN NOTE
· CXR: Somewhat technically limited examination demonstrating parenchymal pattern in the well-visualized mid and lower lung zones that is nonspecific but can be seen in the setting of viral pneumonia  · Patient will complete plaquenil today  Discontinued azithromycin, completed 4/5 days  Rocephin day 5  · Patient was able to weaned off of oxygen, currently in room air  breathing comfortably, does not c/o worsening dyspnea, no increased work of breathing  · Tapering dose of solumedrol, currently at 30 mg IV daily, will switch to PO from tomorrow

## 2020-04-06 NOTE — SOCIAL WORK
LOS: 5    JAYCE advised by Attending that pt will be discharged today versus tomorrow  JAYCE called pt in his room to discuss  Pt still plans on returning to his wife  He will make some phone calls to arrange transportation with tomorrow pickup the latest  IMM#2 completed  JAYCE contacted pt's PO Attila  He requests to be called when pt is officially discharged  JAYCE transferred Lesta Cassette into pt's room to discuss  Attending and nursing informed

## 2020-04-06 NOTE — SOCIAL WORK
Call from Jame Amezquita at [facility]/[address]  She requests to be called on her cell phone to confirm pt's discharge ([phone])

## 2020-04-06 NOTE — PROGRESS NOTES
Progress Note - Dottie Crawford 1950, 77 y o  male MRN: [id number]    Unit/Bed#: [facility] 505-01 Encounter: [id number]    Primary Care Provider: No primary care provider on file  Date and time admitted to hospital: 4/1/2020 11:02 AM        Pneumonia due to COVID-19 virus  Assessment & Plan  · CXR: Somewhat technically limited examination demonstrating parenchymal pattern in the well-visualized mid and lower lung zones that is nonspecific but can be seen in the setting of viral pneumonia  · Patient will complete plaquenil today  Discontinued azithromycin, completed 4/5 days  Rocephin day 5  · Patient was able to weaned off of oxygen, currently in room air  breathing comfortably, does not c/o worsening dyspnea, no increased work of breathing  · Tapering dose of solumedrol, currently at 30 mg IV daily, will switch to PO from tomorrow  * Acute respiratory failure with hypoxia (HCC)  Assessment & Plan  · Secondary to COVID-19  · Plan as above  · Currently patient is breathing comfortably in room air  COVID-19 positive  Assessment & Plan  · Airborne/droplet isolation  · Supportive care      VTE Pharmacologic Prophylaxis:   Pharmacologic: Enoxaparin (Lovenox)  Mechanical VTE Prophylaxis in Place: Yes    Patient Centered Rounds: I have performed bedside rounds with nursing staff today  Discussions with Specialists or Other Care Team Provider: no    Education and Discussions with Family / Patient: patient    Time Spent for Care: 30 minutes  More than 50% of total time spent on counseling and coordination of care as described above  Current Length of Stay: 5 day(s)    Current Patient Status: Inpatient   Certification Statement: The patient will continue to require additional inpatient hospital stay due to acute rest failure    Discharge Plan: home on isolation when discharged    Code Status: Level 1 - Full Code      Subjective:   No fever overnight   No respiratory distress, no cough, no shortness of breath  Has been in room air since yesterday evening  Breathing comfortably in room air  Objective:     Vitals:   Temp (24hrs), Av 4 °F (36 3 °C), Min:97 3 °F (36 3 °C), Max:97 4 °F (36 3 °C)    Temp:  [97 3 °F (36 3 °C)-97 4 °F (36 3 °C)] 97 3 °F (36 3 °C)  HR:  [61] 61  Resp:  [20] 20  BP: (121-140)/(89-94) 140/94  SpO2:  [90 %-99 %] 90 %  Body mass index is 24 82 kg/m²  Input and Output Summary (last 24 hours): Intake/Output Summary (Last 24 hours) at 2020 0734  Last data filed at 2020 1700  Gross per 24 hour   Intake 480 ml   Output    Net 480 ml       Physical Exam:     Physical Exam   Constitutional: He appears well-developed and well-nourished  HENT:   Head: Normocephalic and atraumatic  Right Ear: External ear normal    Left Ear: External ear normal    Nose: Nose normal    Pulmonary/Chest: Effort normal and breath sounds normal    Neurological: He is alert  Skin: Skin is dry  Additional Data:     Labs:    Results from last 7 days   Lab Units 20  0515  20  1135   WBC Thousand/uL 10 00   < > 5 70   HEMOGLOBIN g/dL 12 9*   < > 13 5   HEMATOCRIT % 38 7*   < > 40 1*   PLATELETS Thousands/uL 461*   < > 201   NEUTROS PCT %  --   --  80*   LYMPHS PCT %  --   --  12*   MONOS PCT %  --   --  4   EOS PCT %  --   --  2    < > = values in this interval not displayed  Results from last 7 days   Lab Units 20  0515  20  1135   SODIUM mmol/L 137   < > 136*   POTASSIUM mmol/L 4 1   < > 4 0   CHLORIDE mmol/L 106   < > 104   CO2 mmol/L 24   < > 25   BUN mg/dL 17   < > 19   CREATININE mg/dL 0 98   < > 1 21   ANION GAP mmol/L 7   < > 7   CALCIUM mg/dL 8 2*   < > 8 3*   ALBUMIN g/dL  --   --  3 7   TOTAL BILIRUBIN mg/dL  --   --  0 60   ALK PHOS U/L  --   --  40*   ALT U/L  --   --  31   AST U/L  --   --  40   GLUCOSE RANDOM mg/dL 101*   < > 106*    < > = values in this interval not displayed                   Results from last 7 days   Lab Units 20  9879 04/01/20  1135   LACTIC ACID mmol/L  --  1 3   PROCALCITONIN ng/ml <0 05  --            * I Have Reviewed All Lab Data Listed Above  * Additional Pertinent Lab Tests Reviewed: Jayesh Miller Admission Reviewed         Results from last 7 days   Lab Units 04/01/20  1622 04/01/20  1158 04/01/20  1135   BLOOD CULTURE   --  No Growth After 4 Days  No Growth After 4 Days  LEGIONELLA URINARY ANTIGEN  Negative  --   --        Last 24 Hours Medication List:     Current Facility-Administered Medications:  acetaminophen 650 mg Oral Q6H PRN SARA Ulrich    ascorbic acid 1,000 mg Oral BID Margarita Gore MD    atorvastatin 40 mg Oral Daily With Adam Logan MD    cefTRIAXone 1,000 mg Intravenous Q24H Margarita Goer MD Last Rate: 1,000 mg (04/05/20 2100)   enoxaparin 40 mg Subcutaneous Daily Margarita Gore MD    hydroxychloroquine 200 mg Oral BID Margarita Gore MD    methylPREDNISolone sodium succinate 30 mg Intravenous Daily Margarita Gore MD    polyethylene glycol 17 g Oral Daily PRN Margarita Gore MD    [START ON 4/7/2020] predniSONE 40 mg Oral Daily Margarita Gore MD    zinc sulfate 220 mg Oral Daily Margarita Gore MD         Today, Patient Was Seen By: Margarita Gore MD    ** Please Note: Dictation voice to text software may have been used in the creation of this document   **

## 2020-04-07 VITALS
WEIGHT: 182.98 LBS | HEIGHT: 72 IN | RESPIRATION RATE: 20 BRPM | TEMPERATURE: 99.2 F | OXYGEN SATURATION: 93 % | SYSTOLIC BLOOD PRESSURE: 126 MMHG | BODY MASS INDEX: 24.78 KG/M2 | HEART RATE: 75 BPM | DIASTOLIC BLOOD PRESSURE: 94 MMHG

## 2020-04-07 PROCEDURE — 99239 HOSP IP/OBS DSCHRG MGMT >30: CPT | Performed by: FAMILY MEDICINE

## 2020-04-07 RX ORDER — PREDNISONE 10 MG/1
10 TABLET ORAL SEE ADMIN INSTRUCTIONS
Qty: 16 TABLET | Refills: 0 | Status: SHIPPED | OUTPATIENT
Start: 2020-04-07

## 2020-04-07 RX ORDER — ATORVASTATIN CALCIUM 40 MG/1
40 TABLET, FILM COATED ORAL
Qty: 1 TABLET | Refills: 0 | Status: SHIPPED | OUTPATIENT
Start: 2020-04-07 | End: 2020-04-08

## 2020-04-07 RX ORDER — ZINC SULFATE 50(220)MG
220 CAPSULE ORAL ONCE
Qty: 1 CAPSULE | Refills: 0 | Status: SHIPPED | OUTPATIENT
Start: 2020-04-07 | End: 2020-04-07

## 2020-04-07 RX ADMIN — OXYCODONE HYDROCHLORIDE AND ACETAMINOPHEN 1000 MG: 500 TABLET ORAL at 08:34

## 2020-04-07 RX ADMIN — PREDNISONE 40 MG: 20 TABLET ORAL at 08:35

## 2020-04-07 RX ADMIN — CEFTRIAXONE 1000 MG: 1 INJECTION, SOLUTION INTRAVENOUS at 13:44

## 2020-04-07 RX ADMIN — ZINC SULFATE 220 MG (50 MG) CAPSULE 220 MG: CAPSULE at 08:34

## 2020-04-07 RX ADMIN — METHYLPREDNISOLONE SODIUM SUCCINATE 30 MG: 40 INJECTION, POWDER, FOR SOLUTION INTRAMUSCULAR; INTRAVENOUS at 08:34

## 2020-04-07 RX ADMIN — ENOXAPARIN SODIUM 40 MG: 40 INJECTION SUBCUTANEOUS at 08:35

## 2020-04-07 NOTE — ASSESSMENT & PLAN NOTE
· CXR: Somewhat technically limited examination demonstrating parenchymal pattern in the well-visualized mid and lower lung zones that is nonspecific but can be seen in the setting of viral pneumonia  · Patient will complete plaquenil today  Discontinued azithromycin, completed 4/5 days  Rocephin day 5  · Patient was able to weaned off of oxygen, currently in room air   breathing comfortably, does not c/o worsening dyspnea, no increased work of breathing  · Tapering dose of solumedrol, 1 more dose of vitamin-C and zinc and Lipitor to complete a 5 day treatment

## 2020-04-07 NOTE — SOCIAL WORK
JAYCE left VM with Ruben Quiñones at [facility]/[address] and [address] [facility] advising that pt is discharged  Family providing transport home

## 2020-04-07 NOTE — DISCHARGE SUMMARY
Discharge- Ron Aiken 1950, 77 y o  male MRN: [id number]    Unit/Bed#: [facility] 505-01 Encounter: [id number]    Primary Care Provider: No primary care provider on file  Date and time admitted to hospital: 4/1/2020 11:02 AM        COVID-19 positive  Assessment & Plan  · Airborne/droplet isolation  · Supportive care    Pneumonia due to COVID-19 virus  Assessment & Plan  · CXR: Somewhat technically limited examination demonstrating parenchymal pattern in the well-visualized mid and lower lung zones that is nonspecific but can be seen in the setting of viral pneumonia  · Patient will complete plaquenil today  Discontinued azithromycin, completed 4/5 days  Rocephin day 5  · Patient was able to weaned off of oxygen, currently in room air  breathing comfortably, does not c/o worsening dyspnea, no increased work of breathing  · Tapering dose of solumedrol, 1 more dose of vitamin-C and zinc and Lipitor to complete a 5 day treatment    * Acute respiratory failure with hypoxia ([address] )  Assessment & Plan  · Secondary to COVID-19  · Plan as above  · Currently patient is breathing comfortably in room air  He completed 5 days of Plaquenil will discharge on taper prednisone 1 more dose of zinc vitamin-C and Lipitor             Discharging Physician / Practitioner: Juan R De La Torre MD  PCP: No primary care provider on file    Admission Date:   Admission Orders (From admission, onward)     Ordered        04/01/20 1232  Inpatient Admission  Once                   Discharge Date: 04/07/20    Resolved Problems  Date Reviewed: 4/7/2020    None          Consultations During Hospital Stay:  · None     Procedures Performed:   · none    Significant Findings / Test Results:   · Chest x-ray-Somewhat technically limited examination demonstrating parenchymal pattern in the well-visualized mid and lower lung zones that is nonspecific but can be seen in the setting of viral pneumonia      This examination was marked "immediate notification" in Epic in order to begin the standard process by which the radiology reading room liaison alerts the referring practitioner  · covid 19- positive     Incidental Findings:   · None      Test Results Pending at Discharge (will require follow up): · None     Outpatient Tests Requested:  · None    Complications:  None    Reason for Admission:  Shortness of breath    Hospital Course:     Tamara Shoemaker is a 77 y o  male patient who originally presented to the hospital on 4/1/2020 due to acute hypoxic respiratory failure with shortness of breath secondary to viral pneumonia secondary to COVID-19, he has been placed on treatment completed 5 days of antibiotics with Plaquenil also started on Solu-Medrol vitamin-C and zinc will complete 1 more dose at home as long with Lipitor throughout the hospital stay his oxygen was able to weaned off he was not dyspneic and he was able to saturate 93% without any dyspnea even on exertion while at the hospital and he is medically clear to be discharged home to complete self isolation  Please see above list of diagnoses and related plan for additional information  Condition at Discharge: stable     Discharge Day Visit / Exam:     Subjective:  Patient seen and examined, patient denies any shortness of breath or cough feeling much better  Vitals: Blood Pressure: 126/94 (04/07/20 0700)  Pulse: 75 (04/07/20 0700)  Temperature: 99 2 °F (37 3 °C) (04/07/20 0700)  Temp Source: Temporal (04/07/20 0700)  Respirations: 20 (04/07/20 0700)  Height: 6' (182 9 cm) (04/01/20 1703)  Weight - Scale: 83 kg (182 lb 15 7 oz) (04/01/20 1703)  SpO2: 93 % (04/07/20 0700)  Exam:   Physical Exam   Constitutional: He is oriented to person, place, and time  He appears well-developed and well-nourished  HENT:   Head: Normocephalic and atraumatic  Eyes: Pupils are equal, round, and reactive to light  EOM are normal    Neck: Normal range of motion     Cardiovascular: Normal rate, regular rhythm and normal heart sounds  Pulmonary/Chest: Effort normal and breath sounds normal    Abdominal: Soft  Bowel sounds are normal    Musculoskeletal: Normal range of motion  Neurological: He is alert and oriented to person, place, and time  He has normal reflexes  cranial nerve 2-12 are normal   Normal neurological exam   Skin: Skin is warm  Psychiatric: He has a normal mood and affect  Discussion with Family:  Patient    Discharge instructions/Information to patient and family:   See after visit summary for information provided to patient and family  Provisions for Follow-Up Care:  See after visit summary for information related to follow-up care and any pertinent home health orders  Disposition:     Home    For Discharges to [address] SN[facility]:   · Not Applicable to this Patient - Not Applicable to this Patient    Planned Readmission: no      Discharge Statement:  I spent >35 minutes discharging the patient  This time was spent on the day of discharge  I had direct contact with the patient on the day of discharge  Greater than 50% of the total time was spent examining patient, answering all patient questions, arranging and discussing plan of care with patient as well as directly providing post-discharge instructions  Additional time then spent on discharge activities  Discharge Medications:  See after visit summary for reconciled discharge medications provided to patient and family        ** Please Note: This note has been constructed using a voice recognition system **

## 2020-04-07 NOTE — UTILIZATION REVIEW
Notification of Discharge  This is a Notification of Discharge from our facility [address]  Please be advised that this patient has been discharge from our facility  Below you will find the admission and discharge date and time including the patients disposition  PRESENTATION DATE: 4/1/2020 11:02 AM  IP ADMISSION DATE: 4/1/20 1232   DISCHARGE DATE: 4/7/2020  2:13 PM  DISPOSITION: Home/Self Care Home/Self Care   Admission Orders listed below:  Admission Orders (From admission, onward)     Ordered        04/01/20 1232  Inpatient Admission  Once                   Please contact the UR Department if additional information is required to close this patient's authorization/case  [facility]  Utilization Review Department  Main: [phone] x carefully listen to the prompts  All voicemails are confidential   [email or web address]  Send all requests for admission clinical reviews, approved or denied determinations and any other requests to dedicated fax number below belonging to the campus where the patient is receiving treatment   List of dedicated fax numbers:  [address] DENIALS (Administrative/Medical Necessity) [phone]   [address] (Maternity/NICU/Pediatrics) [phone]   Angel Orellana [phone]   Damion Santiago [phone]   Monroe Baca [phone]   Alfonso Adam [address] [phone]   [facility]  [phone]   [address] [place] [address], [address] W  [address] [address] [address] [facility] [phone]

## 2020-04-07 NOTE — ASSESSMENT & PLAN NOTE
· Secondary to COVID-19  · Plan as above  · Currently patient is breathing comfortably in room air    He completed 5 days of Plaquenil will discharge on taper prednisone 1 more dose of zinc vitamin-C and Lipitor

## 2020-04-07 NOTE — UTILIZATION REVIEW
Continued Stay Review    Date: 4/6/20                       Current Patient Class: IP  Current Level of Care: [facility]    HPI:69 y o  male initially admitted on 4/1/20 to IP with Acute Resp Failure 2/2 Viral Pneumonia - presumptive COVID     Assessment/Plan:  COVID +  Weaned to RA 4/5  + CROW  Chest clear but diminished  Continue tapering dose of SoluMEdrol IV with plan for Prednisone to start 4/7  Completed Zithromax 4/5 and last dose Plaquenil  Today      Pertinent Labs/Diagnostic Results:   Results from last 7 days   Lab Units 04/01/20  1518   SARS-COV-2  Detected*     Results from last 7 days   Lab Units 04/06/20  0515 04/05/20  0525 04/01/20  1135   WBC Thousand/uL 10 00 12 40* 5 70   HEMOGLOBIN g/dL 12 9* 12 7* 13 5   HEMATOCRIT % 38 7* 39 1* 40 1*   PLATELETS Thousands/uL 461* 435 201   NEUTROS ABS Thousands/µL  --   --  4 60     Results from last 7 days   Lab Units 04/06/20  0515 04/05/20  0525 04/01/20  1135   SODIUM mmol/L 137 138 136*   POTASSIUM mmol/L 4 1 4 2 4 0   CHLORIDE mmol/L 106 106 104   CO2 mmol/L 24 26 25   ANION GAP mmol/L 7 6 7   BUN mg/dL 17 18 19   CREATININE mg/dL 0 98 1 03 1 21   EGFR ml/min/1 73sq m 79 74 61   CALCIUM mg/dL 8 2* 8 3* 8 3*     Results from last 7 days   Lab Units 04/01/20  1135   AST U/L 40   ALT U/L 31   ALK PHOS U/L 40*   TOTAL PROTEIN g/dL 7 1   ALBUMIN g/dL 3 7   TOTAL BILIRUBIN mg/dL 0 60     Results from last 7 days   Lab Units 04/06/20  0515 04/05/20  0525 04/01/20  1135   GLUCOSE RANDOM mg/dL 101* 115* 106*     Results from last 7 days   Lab Units 04/01/20  1253   CK TOTAL U/L 193   CK MB INDEX % <1 0   CK MB ng/mL <1 0     Results from last 7 days   Lab Units 04/01/20  1135   TROPONIN I ng/mL <0 01     Results from last 7 days   Lab Units 04/02/20  1443   D-DIMER QUANTITATIVE ug/ml FEU 7 74*     Results from last 7 days   Lab Units 04/01/20  1253   PROCALCITONIN ng/ml <0 05     Results from last 7 days   Lab Units 04/01/20  1135   LACTIC ACID mmol/L 1 3 Results from last 7 days   Lab Units 04/01/20  1135   NT-PRO BNP pg/mL 68 2     Results from last 7 days   Lab Units 04/01/20  1253   FERRITIN ng/mL 630*     Results from last 7 days   Lab Units 04/02/20  1442 04/01/20  1253   CRP mg/L  --  >90 0*   SED RATE mm/hour 80*  --      Results from last 7 days   Lab Units 04/01/20  1622 04/01/20  1253   STREP PNEUMONIAE ANTIGEN, URINE  Negative  --    LEGIONELLA URINARY ANTIGEN  Negative  --    INFLUENZA A PCR   --  None Detected   INFLUENZA B PCR   --  None Detected   RSV PCR   --  None Detected     Results from last 7 days   Lab Units 04/01/20  1158 04/01/20  1135   BLOOD CULTURE  No Growth After 5 Days  No Growth After 5 Days  Vital Signs:   04/06/20 1700  97 4 °F (36 3 °C) 80 20 130/106  94 % None (Room air) Lying   04/06/20 0725  97 3 °F (36 3 °C)  20 140/94 90 % None (Room air) Lying   04/05/20 1700      95 % None (Room air)    04/05/20 1530  97 4 °F (36 3 °C) 61 20 121/89 99 % Nasal cannula 2L  Sitting     Medications:   Scheduled Medications:  Medications:  ascorbic acid 1,000 mg Oral BID   atorvastatin 40 mg Oral Daily With Dinner   cefTRIAXone 1,000 mg Intravenous Q24H   enoxaparin 40 mg Subcutaneous Daily   [START ON 4/10/2020] predniSONE 20 mg Oral Daily   Start 4/7 predniSONE 40 mg Oral Daily start 4/7   zinc sulfate 220 mg Oral Daily     SoluMedrol 30 mg  IV  Qd  Plaquenil 200 mg po bid - Last dose 4/6     Continuous IV Infusions:   NA  PRN Meds:  acetaminophen 650 mg Oral Q6H PRN   polyethylene glycol 17 g Oral Daily PRN       Discharge Plan: [address] 4/7    Network Utilization Review Department  [email or web address]  ATTENTION: Please call with any questions or concerns to [phone] and carefully listen to the prompts so that you are directed to the right person   All voicemails are confidential   Davy Glass all requests for admission clinical reviews, approved or denied determinations and any other requests to dedicated fax number below belonging to the campus where the patient is receiving treatment   List of dedicated fax numbers for the Facilities:  [address] DENIALS (Administrative/Medical Necessity) [phone]   [address]  (Maternity/NICU/Pediatrics) [phone]   Jeanette Hopes [phone]   Nicole Macias [phone]   Shaista Marss [phone]   Jose Alejandro Linder [phone]   [address] [place] [address] [phone]   [facility]  [phone]   [address] [place] [address], [address]  2401 [facility] 1000 W [facility] [phone]

## 2020-04-07 NOTE — PLAN OF CARE
Problem: Potential for Falls  Goal: Patient will remain free of falls  Description  INTERVENTIONS:  - Assess patient frequently for physical needs  -  Identify cognitive and physical deficits and behaviors that affect risk of falls    -  [place] fall precautions as indicated by assessment   - Educate patient/family on patient safety including physical limitations  - Instruct patient to call for assistance with activity based on assessment  - Modify environment to reduce risk of injury  - Consider OT/PT consult to assist with strengthening/mobility  Outcome: Adequate for Discharge     Problem: PAIN - ADULT  Goal: Verbalizes/displays adequate comfort level or baseline comfort level  Description  Interventions:  - Encourage patient to monitor pain and request assistance  - Assess pain using appropriate pain scale  - Administer analgesics based on type and severity of pain and evaluate response  - Implement non-pharmacological measures as appropriate and evaluate response  - Consider cultural and social influences on pain and pain management  - Notify physician/advanced practitioner if interventions unsuccessful or patient reports new pain  Outcome: Adequate for Discharge     Problem: INFECTION - ADULT  Goal: Absence or prevention of progression during hospitalization  Description  INTERVENTIONS:  - Assess and monitor for signs and symptoms of infection  - Monitor lab/diagnostic results  - Monitor all insertion sites, i e  indwelling lines, tubes, and drains  - Monitor endotracheal if appropriate and nasal secretions for changes in amount and color  - [place] appropriate cooling/warming therapies per order  - Administer medications as ordered  - Instruct and encourage patient and family to use good hand hygiene technique  - Identify and instruct in appropriate isolation precautions for identified infection/condition  Outcome: Adequate for Discharge  Goal: Absence of fever/infection during neutropenic period  Description  INTERVENTIONS:  - Monitor WBC    Outcome: Adequate for Discharge     Problem: SAFETY ADULT  Goal: Patient will remain free of falls  Description  INTERVENTIONS:  - Assess patient frequently for physical needs  -  Identify cognitive and physical deficits and behaviors that affect risk of falls    -  [place] fall precautions as indicated by assessment   - Educate patient/family on patient safety including physical limitations  - Instruct patient to call for assistance with activity based on assessment  - Modify environment to reduce risk of injury  - Consider OT/PT consult to assist with strengthening/mobility  Outcome: Adequate for Discharge  Goal: Maintain or return to baseline ADL function  Description  INTERVENTIONS:  -  Assess patient's ability to carry out ADLs; assess patient's baseline for ADL function and identify physical deficits which impact ability to perform ADLs (bathing, care of mouth/teeth, toileting, grooming, dressing, etc )  - Assess/evaluate cause of self-care deficits   - Assess range of motion  - Assess patient's mobility; develop plan if impaired  - Assess patient's need for assistive devices and provide as appropriate  - Encourage maximum independence but intervene and supervise when necessary  - Involve family in performance of ADLs  - Assess for home care needs following discharge   - Consider OT consult to assist with ADL evaluation and planning for discharge  - Provide patient education as appropriate  Outcome: Adequate for Discharge  Goal: Maintain or return mobility status to optimal level  Description  INTERVENTIONS:  - Assess patient's baseline mobility status (ambulation, transfers, stairs, etc )    - Identify cognitive and physical deficits and behaviors that affect mobility  - Identify mobility aids required to assist with transfers and/or ambulation (gait belt, sit-to-stand, lift, walker, cane, etc )  - [place] fall precautions as indicated by assessment  - Record patient progress and toleration of activity level on Mobility SBAR; progress patient to next Phase/Stage  - Instruct patient to call for assistance with activity based on assessment  - Consider rehabilitation consult to assist with strengthening/weightbearing, etc   Outcome: Adequate for Discharge     Problem: DISCHARGE PLANNING  Goal: Discharge to home or other facility with appropriate resources  Description  INTERVENTIONS:  - Identify barriers to discharge w/patient and caregiver  - Arrange for needed discharge resources and transportation as appropriate  - Identify discharge learning needs (meds, wound care, etc )  - Arrange for interpretive services to assist at discharge as needed  - Refer to Case Management Department for coordinating discharge planning if the patient needs post-hospital services based on physician/advanced practitioner order or complex needs related to functional status, cognitive ability, or social support system  Outcome: Adequate for Discharge     Problem: Knowledge Deficit  Goal: Patient/family/caregiver demonstrates understanding of disease process, treatment plan, medications, and discharge instructions  Description  Complete learning assessment and assess knowledge base    Interventions:  - Provide teaching at level of understanding  - Provide teaching via preferred learning methods  Outcome: Adequate for Discharge

## 2020-04-24 NOTE — UTILIZATION REVIEW
Good afternoon,  We are missing determination for dates of service 04/05/2020-04/07/2020 authorization number [id number]  Please fax determination to [phone]      Notification of Discharge  This is a Notification of Discharge from our facility [facility]  Please be advised that this patient has been discharge from our facility  Below you will find the admission and discharge date and time including the patients disposition  PRESENTATION DATE: 4/1/2020 11:02 AM  OBS ADMISSION DATE: N/A  IP ADMISSION DATE: 4/1/20 1232   DISCHARGE DATE: 4/7/2020  2:13 PM  DISPOSITION: Home/Self Care Home/Self Care   Admission Orders listed below:  Admission Orders (From admission, onward)     Ordered        04/01/20 1232  Inpatient Admission  Once                 Please contact the UR Department if additional information is required to close this patient's authorization/case  [address] [facility] Utilization Review Department  Main: [phone] x carefully listen to the prompts  All voicemails are confidential   [email or web address]  Send all requests for admission clinical reviews, approved or denied determinations and any other requests to dedicated fax number below belonging to the campus where the patient is receiving treatment   List of dedicated fax numbers:  [address] DENIALS (Administrative/Medical Necessity) [phone]   [address] (Maternity/NICU/Pediatrics) [phone] 5400 [facility] [phone]   Juan Hart [phone]   Ruperto Saldana [phone]   Izaiah Case [facility] 1525 [facility] [phone]   [facility]  [phone]   2205 [facility]  2401 [facility] 1000 [address] [facility] [phone]